# Patient Record
Sex: FEMALE | Race: WHITE | NOT HISPANIC OR LATINO | Employment: OTHER | URBAN - METROPOLITAN AREA
[De-identification: names, ages, dates, MRNs, and addresses within clinical notes are randomized per-mention and may not be internally consistent; named-entity substitution may affect disease eponyms.]

---

## 2024-05-30 ENCOUNTER — APPOINTMENT (OUTPATIENT)
Dept: GENERAL RADIOLOGY | Facility: HOSPITAL | Age: 54
DRG: 897 | End: 2024-05-30
Payer: MEDICAID

## 2024-05-30 ENCOUNTER — APPOINTMENT (OUTPATIENT)
Dept: CT IMAGING | Facility: HOSPITAL | Age: 54
DRG: 897 | End: 2024-05-30
Payer: MEDICAID

## 2024-05-30 ENCOUNTER — HOSPITAL ENCOUNTER (INPATIENT)
Facility: HOSPITAL | Age: 54
LOS: 7 days | Discharge: NURSING, REHAB OR LONG-TERM CARE | DRG: 897 | End: 2024-06-07
Attending: EMERGENCY MEDICINE | Admitting: FAMILY MEDICINE
Payer: MEDICAID

## 2024-05-30 DIAGNOSIS — N30.00 ACUTE CYSTITIS WITHOUT HEMATURIA: ICD-10-CM

## 2024-05-30 DIAGNOSIS — F10.939 ALCOHOL WITHDRAWAL SYNDROME WITH COMPLICATION: Primary | ICD-10-CM

## 2024-05-30 DIAGNOSIS — E87.6 HYPOKALEMIA: ICD-10-CM

## 2024-05-30 PROBLEM — F10.929 ALCOHOL INTOXICATION: Status: ACTIVE | Noted: 2024-05-30

## 2024-05-30 PROBLEM — J44.9 COPD (CHRONIC OBSTRUCTIVE PULMONARY DISEASE): Status: ACTIVE | Noted: 2024-05-30

## 2024-05-30 PROBLEM — R79.89 ELEVATED LFTS: Status: ACTIVE | Noted: 2024-05-30

## 2024-05-30 LAB
ALBUMIN SERPL-MCNC: 3.8 G/DL (ref 3.5–5.2)
ALBUMIN/GLOB SERPL: 1.2 G/DL
ALP SERPL-CCNC: 232 U/L (ref 39–117)
ALT SERPL W P-5'-P-CCNC: 87 U/L (ref 1–33)
AMPHET+METHAMPHET UR QL: NEGATIVE
AMPHETAMINES UR QL: NEGATIVE
ANION GAP SERPL CALCULATED.3IONS-SCNC: 14 MMOL/L (ref 5–15)
AST SERPL-CCNC: 289 U/L (ref 1–32)
BACTERIA UR QL AUTO: ABNORMAL /HPF
BARBITURATES UR QL SCN: NEGATIVE
BASOPHILS # BLD AUTO: 0.07 10*3/MM3 (ref 0–0.2)
BASOPHILS NFR BLD AUTO: 1.3 % (ref 0–1.5)
BENZODIAZ UR QL SCN: NEGATIVE
BILIRUB SERPL-MCNC: 0.9 MG/DL (ref 0–1.2)
BILIRUB UR QL STRIP: ABNORMAL
BUN SERPL-MCNC: 7 MG/DL (ref 6–20)
BUN/CREAT SERPL: 9.6 (ref 7–25)
BUPRENORPHINE SERPL-MCNC: NEGATIVE NG/ML
CALCIUM SPEC-SCNC: 8.7 MG/DL (ref 8.6–10.5)
CANNABINOIDS SERPL QL: POSITIVE
CHLORIDE SERPL-SCNC: 101 MMOL/L (ref 98–107)
CLARITY UR: ABNORMAL
CO2 SERPL-SCNC: 31 MMOL/L (ref 22–29)
COCAINE UR QL: NEGATIVE
COLOR UR: ABNORMAL
CREAT SERPL-MCNC: 0.73 MG/DL (ref 0.57–1)
D-LACTATE SERPL-SCNC: 2.7 MMOL/L (ref 0.5–2)
DEPRECATED RDW RBC AUTO: 55 FL (ref 37–54)
EGFRCR SERPLBLD CKD-EPI 2021: 99.7 ML/MIN/1.73
EOSINOPHIL # BLD AUTO: 0.05 10*3/MM3 (ref 0–0.4)
EOSINOPHIL NFR BLD AUTO: 0.9 % (ref 0.3–6.2)
ERYTHROCYTE [DISTWIDTH] IN BLOOD BY AUTOMATED COUNT: 14.6 % (ref 12.3–15.4)
ETHANOL BLD-MCNC: 295 MG/DL (ref 0–10)
FENTANYL UR-MCNC: NEGATIVE NG/ML
GLOBULIN UR ELPH-MCNC: 3.3 GM/DL
GLUCOSE SERPL-MCNC: 121 MG/DL (ref 65–99)
GLUCOSE UR STRIP-MCNC: NEGATIVE MG/DL
HCT VFR BLD AUTO: 40.9 % (ref 34–46.6)
HGB BLD-MCNC: 14.1 G/DL (ref 12–15.9)
HGB UR QL STRIP.AUTO: ABNORMAL
HYALINE CASTS UR QL AUTO: ABNORMAL /LPF
IMM GRANULOCYTES # BLD AUTO: 0.03 10*3/MM3 (ref 0–0.05)
IMM GRANULOCYTES NFR BLD AUTO: 0.5 % (ref 0–0.5)
KETONES UR QL STRIP: ABNORMAL
LEUKOCYTE ESTERASE UR QL STRIP.AUTO: ABNORMAL
LYMPHOCYTES # BLD AUTO: 2 10*3/MM3 (ref 0.7–3.1)
LYMPHOCYTES NFR BLD AUTO: 36.6 % (ref 19.6–45.3)
MAGNESIUM SERPL-MCNC: 1.7 MG/DL (ref 1.6–2.6)
MCH RBC QN AUTO: 35.7 PG (ref 26.6–33)
MCHC RBC AUTO-ENTMCNC: 34.5 G/DL (ref 31.5–35.7)
MCV RBC AUTO: 103.5 FL (ref 79–97)
METHADONE UR QL SCN: NEGATIVE
MONOCYTES # BLD AUTO: 0.47 10*3/MM3 (ref 0.1–0.9)
MONOCYTES NFR BLD AUTO: 8.6 % (ref 5–12)
NEUTROPHILS NFR BLD AUTO: 2.85 10*3/MM3 (ref 1.7–7)
NEUTROPHILS NFR BLD AUTO: 52.1 % (ref 42.7–76)
NITRITE UR QL STRIP: POSITIVE
NRBC BLD AUTO-RTO: 0 /100 WBC (ref 0–0.2)
OPIATES UR QL: NEGATIVE
OXYCODONE UR QL SCN: NEGATIVE
PCP UR QL SCN: NEGATIVE
PH UR STRIP.AUTO: 6 [PH] (ref 5–8)
PLATELET # BLD AUTO: 127 10*3/MM3 (ref 140–450)
PMV BLD AUTO: 10.3 FL (ref 6–12)
POTASSIUM SERPL-SCNC: 2.9 MMOL/L (ref 3.5–5.2)
PROT SERPL-MCNC: 7.1 G/DL (ref 6–8.5)
PROT UR QL STRIP: ABNORMAL
RBC # BLD AUTO: 3.95 10*6/MM3 (ref 3.77–5.28)
RBC # UR STRIP: ABNORMAL /HPF
REF LAB TEST METHOD: ABNORMAL
SODIUM SERPL-SCNC: 146 MMOL/L (ref 136–145)
SP GR UR STRIP: 1.03 (ref 1–1.03)
SQUAMOUS #/AREA URNS HPF: ABNORMAL /HPF
TRICYCLICS UR QL SCN: NEGATIVE
UROBILINOGEN UR QL STRIP: ABNORMAL
WBC # UR STRIP: ABNORMAL /HPF
WBC NRBC COR # BLD AUTO: 5.47 10*3/MM3 (ref 3.4–10.8)

## 2024-05-30 PROCEDURE — 25010000002 CEFTRIAXONE PER 250 MG: Performed by: PHYSICIAN ASSISTANT

## 2024-05-30 PROCEDURE — 99291 CRITICAL CARE FIRST HOUR: CPT

## 2024-05-30 PROCEDURE — 25010000002 THIAMINE HCL 200 MG/2ML SOLUTION: Performed by: NURSE PRACTITIONER

## 2024-05-30 PROCEDURE — G0378 HOSPITAL OBSERVATION PER HR: HCPCS

## 2024-05-30 PROCEDURE — 80307 DRUG TEST PRSMV CHEM ANLYZR: CPT | Performed by: PHYSICIAN ASSISTANT

## 2024-05-30 PROCEDURE — 25010000002 THIAMINE HCL 200 MG/2ML SOLUTION: Performed by: PHYSICIAN ASSISTANT

## 2024-05-30 PROCEDURE — 36415 COLL VENOUS BLD VENIPUNCTURE: CPT

## 2024-05-30 PROCEDURE — 25810000003 SODIUM CHLORIDE 0.9 % SOLUTION: Performed by: PHYSICIAN ASSISTANT

## 2024-05-30 PROCEDURE — 25010000002 ONDANSETRON PER 1 MG: Performed by: NURSE PRACTITIONER

## 2024-05-30 PROCEDURE — 25810000003 LACTATED RINGERS PER 1000 ML: Performed by: NURSE PRACTITIONER

## 2024-05-30 PROCEDURE — 82077 ASSAY SPEC XCP UR&BREATH IA: CPT | Performed by: PHYSICIAN ASSISTANT

## 2024-05-30 PROCEDURE — 83605 ASSAY OF LACTIC ACID: CPT | Performed by: PHYSICIAN ASSISTANT

## 2024-05-30 PROCEDURE — 87086 URINE CULTURE/COLONY COUNT: CPT | Performed by: NURSE PRACTITIONER

## 2024-05-30 PROCEDURE — 87088 URINE BACTERIA CULTURE: CPT | Performed by: NURSE PRACTITIONER

## 2024-05-30 PROCEDURE — 87186 SC STD MICRODIL/AGAR DIL: CPT | Performed by: NURSE PRACTITIONER

## 2024-05-30 PROCEDURE — 87040 BLOOD CULTURE FOR BACTERIA: CPT | Performed by: PHYSICIAN ASSISTANT

## 2024-05-30 PROCEDURE — 85025 COMPLETE CBC W/AUTO DIFF WBC: CPT | Performed by: PHYSICIAN ASSISTANT

## 2024-05-30 PROCEDURE — 71045 X-RAY EXAM CHEST 1 VIEW: CPT

## 2024-05-30 PROCEDURE — 25510000001 IOPAMIDOL 61 % SOLUTION: Performed by: EMERGENCY MEDICINE

## 2024-05-30 PROCEDURE — 81001 URINALYSIS AUTO W/SCOPE: CPT | Performed by: PHYSICIAN ASSISTANT

## 2024-05-30 PROCEDURE — 25010000002 LORAZEPAM PER 2 MG: Performed by: EMERGENCY MEDICINE

## 2024-05-30 PROCEDURE — 74177 CT ABD & PELVIS W/CONTRAST: CPT

## 2024-05-30 PROCEDURE — 83735 ASSAY OF MAGNESIUM: CPT | Performed by: PHYSICIAN ASSISTANT

## 2024-05-30 PROCEDURE — 99223 1ST HOSP IP/OBS HIGH 75: CPT | Performed by: NURSE PRACTITIONER

## 2024-05-30 PROCEDURE — 80053 COMPREHEN METABOLIC PANEL: CPT | Performed by: PHYSICIAN ASSISTANT

## 2024-05-30 RX ORDER — SODIUM CHLORIDE, SODIUM LACTATE, POTASSIUM CHLORIDE, CALCIUM CHLORIDE 600; 310; 30; 20 MG/100ML; MG/100ML; MG/100ML; MG/100ML
75 INJECTION, SOLUTION INTRAVENOUS CONTINUOUS
Status: ACTIVE | OUTPATIENT
Start: 2024-05-30 | End: 2024-05-31

## 2024-05-30 RX ORDER — LORAZEPAM 1 MG/1
1 TABLET ORAL DAILY
Qty: 1 TABLET | Refills: 0 | Status: COMPLETED | OUTPATIENT
Start: 2024-06-03 | End: 2024-06-03

## 2024-05-30 RX ORDER — MIDAZOLAM HYDROCHLORIDE 1 MG/ML
2 INJECTION INTRAMUSCULAR; INTRAVENOUS
Status: DISCONTINUED | OUTPATIENT
Start: 2024-05-30 | End: 2024-06-03

## 2024-05-30 RX ORDER — POTASSIUM CHLORIDE 1.5 G/1.58G
40 POWDER, FOR SOLUTION ORAL EVERY 4 HOURS
Status: COMPLETED | OUTPATIENT
Start: 2024-05-30 | End: 2024-05-31

## 2024-05-30 RX ORDER — LORAZEPAM 2 MG/ML
1 INJECTION INTRAMUSCULAR ONCE
Status: COMPLETED | OUTPATIENT
Start: 2024-05-30 | End: 2024-05-30

## 2024-05-30 RX ORDER — BISACODYL 5 MG/1
5 TABLET, DELAYED RELEASE ORAL DAILY PRN
Status: DISCONTINUED | OUTPATIENT
Start: 2024-05-30 | End: 2024-06-07 | Stop reason: HOSPADM

## 2024-05-30 RX ORDER — LORAZEPAM 1 MG/1
1 TABLET ORAL EVERY 6 HOURS
Qty: 4 TABLET | Refills: 0 | Status: DISPENSED | OUTPATIENT
Start: 2024-06-01 | End: 2024-06-01

## 2024-05-30 RX ORDER — SODIUM CHLORIDE 0.9 % (FLUSH) 0.9 %
10 SYRINGE (ML) INJECTION EVERY 12 HOURS SCHEDULED
Status: DISCONTINUED | OUTPATIENT
Start: 2024-05-30 | End: 2024-06-07 | Stop reason: HOSPADM

## 2024-05-30 RX ORDER — ENOXAPARIN SODIUM 100 MG/ML
40 INJECTION SUBCUTANEOUS DAILY
Status: DISCONTINUED | OUTPATIENT
Start: 2024-05-31 | End: 2024-06-07 | Stop reason: HOSPADM

## 2024-05-30 RX ORDER — AMOXICILLIN 250 MG
2 CAPSULE ORAL 2 TIMES DAILY
Status: DISCONTINUED | OUTPATIENT
Start: 2024-05-30 | End: 2024-06-07 | Stop reason: HOSPADM

## 2024-05-30 RX ORDER — LORAZEPAM 1 MG/1
1 TABLET ORAL EVERY 12 HOURS SCHEDULED
Qty: 2 TABLET | Refills: 0 | Status: COMPLETED | OUTPATIENT
Start: 2024-06-02 | End: 2024-06-02

## 2024-05-30 RX ORDER — LORAZEPAM 1 MG/1
2 TABLET ORAL
Status: DISCONTINUED | OUTPATIENT
Start: 2024-05-30 | End: 2024-06-03

## 2024-05-30 RX ORDER — MIDAZOLAM HYDROCHLORIDE 1 MG/ML
4 INJECTION INTRAMUSCULAR; INTRAVENOUS
Status: DISCONTINUED | OUTPATIENT
Start: 2024-05-30 | End: 2024-06-03

## 2024-05-30 RX ORDER — POLYETHYLENE GLYCOL 3350 17 G/17G
17 POWDER, FOR SOLUTION ORAL DAILY PRN
Status: DISCONTINUED | OUTPATIENT
Start: 2024-05-30 | End: 2024-06-07 | Stop reason: HOSPADM

## 2024-05-30 RX ORDER — THIAMINE HYDROCHLORIDE 100 MG/ML
200 INJECTION, SOLUTION INTRAMUSCULAR; INTRAVENOUS EVERY 8 HOURS SCHEDULED
Qty: 30 ML | Refills: 0 | Status: DISPENSED | OUTPATIENT
Start: 2024-05-31 | End: 2024-06-04

## 2024-05-30 RX ORDER — IPRATROPIUM BROMIDE AND ALBUTEROL SULFATE 2.5; .5 MG/3ML; MG/3ML
3 SOLUTION RESPIRATORY (INHALATION) EVERY 4 HOURS PRN
Status: DISCONTINUED | OUTPATIENT
Start: 2024-05-30 | End: 2024-06-07 | Stop reason: HOSPADM

## 2024-05-30 RX ORDER — ONDANSETRON 2 MG/ML
4 INJECTION INTRAMUSCULAR; INTRAVENOUS EVERY 6 HOURS PRN
Status: DISCONTINUED | OUTPATIENT
Start: 2024-05-30 | End: 2024-06-07 | Stop reason: HOSPADM

## 2024-05-30 RX ORDER — BISACODYL 10 MG
10 SUPPOSITORY, RECTAL RECTAL DAILY PRN
Status: DISCONTINUED | OUTPATIENT
Start: 2024-05-30 | End: 2024-06-07 | Stop reason: HOSPADM

## 2024-05-30 RX ORDER — SODIUM CHLORIDE 0.9 % (FLUSH) 0.9 %
10 SYRINGE (ML) INJECTION AS NEEDED
Status: DISCONTINUED | OUTPATIENT
Start: 2024-05-30 | End: 2024-06-07 | Stop reason: HOSPADM

## 2024-05-30 RX ORDER — NICOTINE 21 MG/24HR
1 PATCH, TRANSDERMAL 24 HOURS TRANSDERMAL NIGHTLY
Status: DISCONTINUED | OUTPATIENT
Start: 2024-05-30 | End: 2024-06-04

## 2024-05-30 RX ORDER — NITROGLYCERIN 0.4 MG/1
0.4 TABLET SUBLINGUAL
Status: DISCONTINUED | OUTPATIENT
Start: 2024-05-30 | End: 2024-06-07 | Stop reason: HOSPADM

## 2024-05-30 RX ORDER — NYSTATIN 100000 [USP'U]/G
POWDER TOPICAL EVERY 12 HOURS SCHEDULED
Status: DISCONTINUED | OUTPATIENT
Start: 2024-05-30 | End: 2024-06-07 | Stop reason: HOSPADM

## 2024-05-30 RX ORDER — SODIUM CHLORIDE 9 MG/ML
40 INJECTION, SOLUTION INTRAVENOUS AS NEEDED
Status: DISCONTINUED | OUTPATIENT
Start: 2024-05-30 | End: 2024-06-07 | Stop reason: HOSPADM

## 2024-05-30 RX ORDER — LORAZEPAM 1 MG/1
2 TABLET ORAL EVERY 6 HOURS
Qty: 8 TABLET | Refills: 0 | Status: DISPENSED | OUTPATIENT
Start: 2024-05-31 | End: 2024-05-31

## 2024-05-30 RX ORDER — THIAMINE HYDROCHLORIDE 100 MG/ML
100 INJECTION, SOLUTION INTRAMUSCULAR; INTRAVENOUS ONCE
Status: COMPLETED | OUTPATIENT
Start: 2024-05-30 | End: 2024-05-30

## 2024-05-30 RX ORDER — FOLIC ACID 1 MG/1
1 TABLET ORAL DAILY
Status: DISCONTINUED | OUTPATIENT
Start: 2024-05-31 | End: 2024-06-07 | Stop reason: HOSPADM

## 2024-05-30 RX ORDER — MULTIPLE VITAMINS W/ MINERALS TAB 9MG-400MCG
1 TAB ORAL DAILY
Status: DISCONTINUED | OUTPATIENT
Start: 2024-05-31 | End: 2024-06-07 | Stop reason: HOSPADM

## 2024-05-30 RX ORDER — ONDANSETRON 4 MG/1
4 TABLET, ORALLY DISINTEGRATING ORAL EVERY 6 HOURS PRN
Status: DISCONTINUED | OUTPATIENT
Start: 2024-05-30 | End: 2024-06-07 | Stop reason: HOSPADM

## 2024-05-30 RX ORDER — LORAZEPAM 1 MG/1
1 TABLET ORAL
Status: DISCONTINUED | OUTPATIENT
Start: 2024-05-30 | End: 2024-06-03 | Stop reason: SDUPTHER

## 2024-05-30 RX ADMIN — SODIUM CHLORIDE 1000 MG: 900 INJECTION INTRAVENOUS at 20:53

## 2024-05-30 RX ADMIN — Medication 10 ML: at 23:10

## 2024-05-30 RX ADMIN — IOPAMIDOL 85 ML: 612 INJECTION, SOLUTION INTRAVENOUS at 20:08

## 2024-05-30 RX ADMIN — THIAMINE HYDROCHLORIDE 100 MG: 100 INJECTION, SOLUTION INTRAMUSCULAR; INTRAVENOUS at 20:23

## 2024-05-30 RX ADMIN — SENNOSIDES AND DOCUSATE SODIUM 2 TABLET: 8.6; 5 TABLET ORAL at 23:09

## 2024-05-30 RX ADMIN — LORAZEPAM 1 MG: 2 INJECTION INTRAMUSCULAR; INTRAVENOUS at 19:48

## 2024-05-30 RX ADMIN — SODIUM CHLORIDE 1000 ML: 9 INJECTION, SOLUTION INTRAVENOUS at 19:45

## 2024-05-30 RX ADMIN — Medication 1 PATCH: at 21:47

## 2024-05-30 RX ADMIN — SODIUM CHLORIDE, POTASSIUM CHLORIDE, SODIUM LACTATE AND CALCIUM CHLORIDE 75 ML/HR: 600; 310; 30; 20 INJECTION, SOLUTION INTRAVENOUS at 23:09

## 2024-05-30 RX ADMIN — THIAMINE HYDROCHLORIDE 200 MG: 100 INJECTION, SOLUTION INTRAMUSCULAR; INTRAVENOUS at 23:10

## 2024-05-30 RX ADMIN — LORAZEPAM 2 MG: 1 TABLET ORAL at 23:10

## 2024-05-30 RX ADMIN — SODIUM CHLORIDE 1000 ML: 9 INJECTION, SOLUTION INTRAVENOUS at 20:38

## 2024-05-30 RX ADMIN — POTASSIUM CHLORIDE 40 MEQ: 1.5 POWDER, FOR SOLUTION ORAL at 20:38

## 2024-05-30 RX ADMIN — NYSTATIN: 100000 POWDER TOPICAL at 23:09

## 2024-05-30 RX ADMIN — ONDANSETRON 4 MG: 2 INJECTION INTRAMUSCULAR; INTRAVENOUS at 23:10

## 2024-05-30 NOTE — ED PROVIDER NOTES
"Subjective  History of Present Illness:    Chief Complaint: Alcohol abuse, requesting detox, hurting all over  History of Present Illness: 53-year-old female admits to alcohol abuse, drinks a pint a day she reports, she is complaining of pain all over, including her left flank, and both legs, she is here with her daughter who also states that she has not been eating or drinking well, she has been drinking heavily for at least 10 years.  Onset: Gradual  Duration: Patient drinks daily  Exacerbating / Alleviating factors: Left flank pain, aching all over, leg pain, body aches  Associated symptoms: Anxious, tearful      Nurses Notes reviewed and agree, including vitals, allergies, social history and prior medical history.     REVIEW OF SYSTEMS: All systems reviewed and not pertinent unless noted.    Review of Systems   Constitutional:  Positive for fatigue.   Gastrointestinal:  Positive for abdominal pain.   Neurological:  Positive for weakness.   Psychiatric/Behavioral:          Anxious, tearful   All other systems reviewed and are negative.      Past Medical History:   Diagnosis Date    COPD (chronic obstructive pulmonary disease)        Allergies:    Rifampin      Past Surgical History:   Procedure Laterality Date    CHOLECYSTECTOMY      HYSTERECTOMY           Social History     Socioeconomic History    Marital status: Single   Tobacco Use    Smoking status: Every Day     Types: Cigarettes   Vaping Use    Vaping status: Never Used   Substance and Sexual Activity    Drug use: Not Currently         History reviewed. No pertinent family history.    Objective  Physical Exam:  /90   Pulse 111   Temp 99.1 °F (37.3 °C) (Oral)   Resp 25   Ht 157.5 cm (62\")   Wt 72.6 kg (160 lb)   SpO2 93%   BMI 29.26 kg/m²      Physical Exam  Vitals and nursing note reviewed.   Constitutional:       Appearance: She is well-developed.      Comments: Disheveled appearance   HENT:      Head: Normocephalic and atraumatic.   Eyes:    "   Extraocular Movements: Extraocular movements intact.   Cardiovascular:      Rate and Rhythm: Regular rhythm. Tachycardia present.   Pulmonary:      Effort: Pulmonary effort is normal.      Breath sounds: Normal breath sounds.   Abdominal:      Palpations: Abdomen is soft.   Musculoskeletal:         General: Normal range of motion.      Cervical back: Normal range of motion and neck supple.   Skin:     General: Skin is warm and dry.   Neurological:      Mental Status: She is oriented to person, place, and time.      Deep Tendon Reflexes: Reflexes are normal and symmetric.           Critical Care    Performed by: Delroy Mcbride Jr., PA-C  Authorized by: Zander Carlson MD    Critical care provider statement:     Critical care time (minutes):  30    Critical care time was exclusive of:  Separately billable procedures and treating other patients and teaching time    Critical care was necessary to treat or prevent imminent or life-threatening deterioration of the following conditions: Acute alcohol detox, hypokalemia, requiring cardiac monitoring, electrolyte replacement, and benzodiazepine to prevent seizures.    Critical care was time spent personally by me on the following activities:  Blood draw for specimens, development of treatment plan with patient or surrogate, discussions with consultants, discussions with primary provider, evaluation of patient's response to treatment, examination of patient, ordering and performing treatments and interventions, ordering and review of laboratory studies, ordering and review of radiographic studies, pulse oximetry, re-evaluation of patient's condition and review of old charts    Care discussed with: admitting provider        ED Course:    ED Course as of 05/31/24 0049   u May 30, 2024   1925 [facility]draw[facility] - MDCalc  Calculated on May 30 2024 7:24 PM  18 points -> Patients with scores =9 may require medication for withdrawal. [CS]   2014 Review of  previous  non ED visits, prior labs, prior imaging, available notes from prior evaluations or visits with specialists, medication list, allergies, past medical history, past surgical history     [CS]   2124 I Personally reviewed all laboratory studies performed in the emergency department  [CS]   2125 Discussed the case with Dr. Hughes, patient accepted for admission [CS]      ED Course User Index  [CS] Delroy Mcbride Jr., PAARANZA       Lab Results (last 24 hours)       Procedure Component Value Units Date/Time    CBC Auto Differential [[id number]]  (Abnormal) Collected: 05/30/24 1940    Specimen: Blood Updated: 05/30/24 2006     WBC 5.47 10*3/mm3      RBC 3.95 10*6/mm3      Hemoglobin 14.1 g/dL      Hematocrit 40.9 %      .5 fL      MCH 35.7 pg      MCHC 34.5 g/dL      RDW 14.6 %      RDW-SD 55.0 fl      MPV 10.3 fL      Platelets 127 10*3/mm3      Neutrophil % 52.1 %      Lymphocyte % 36.6 %      Monocyte % 8.6 %      Eosinophil % 0.9 %      Basophil % 1.3 %      Immature Grans % 0.5 %      Neutrophils, Absolute 2.85 10*3/mm3      Lymphocytes, Absolute 2.00 10*3/mm3      Monocytes, Absolute 0.47 10*3/mm3      Eosinophils, Absolute 0.05 10*3/mm3      Basophils, Absolute 0.07 10*3/mm3      Immature Grans, Absolute 0.03 10*3/mm3      nRBC 0.0 /100 WBC     Comprehensive Metabolic Panel [[id number]]  (Abnormal) Collected: 05/30/24 1940    Specimen: Blood Updated: 05/30/24 2020     Glucose 121 mg/dL      BUN 7 mg/dL      Creatinine 0.73 mg/dL      Sodium 146 mmol/L      Potassium 2.9 mmol/L      Chloride 101 mmol/L      CO2 31.0 mmol/L      Calcium 8.7 mg/dL      Total Protein 7.1 g/dL      Albumin 3.8 g/dL      ALT (SGPT) 87 U/L      AST (SGOT) 289 U/L      Alkaline Phosphatase 232 U/L      Total Bilirubin 0.9 mg/dL      Globulin 3.3 gm/dL      Comment: Calculated Result        A/G Ratio 1.2 g/dL      BUN/Creatinine Ratio 9.6     Anion Gap 14.0 mmol/L      eGFR 99.7 mL/min/1.73     Narrative:      GFR Normal  >60  Chronic Kidney Disease <60  Kidney Failure <15      Magnesium [[id number]]  (Normal) Collected: 05/30/24 1940    Specimen: Blood Updated: 05/30/24 2020     Magnesium 1.7 mg/dL     Urinalysis With Microscopic If Indicated (No Culture) - Urine, Clean Catch [[id number]]  (Abnormal) Collected: 05/30/24 1940    Specimen: Urine, Clean Catch Updated: 05/30/24 2019     Color, UA [place]     Appearance, UA Turbid     pH, UA 6.0     Specific Gravity, UA 1.029     Glucose, UA Negative     Ketones, UA Trace     Bilirubin, UA Moderate (2+)     Blood, UA Trace     Protein,  mg/dL (2+)     Leuk Esterase, UA Moderate (2+)     Nitrite, UA Positive     Urobilinogen, UA 2.0 E.U./dL    Ethanol [[id number]]  (Abnormal) Collected: 05/30/24 1940    Specimen: Blood Updated: 05/30/24 2020     Ethanol 295 mg/dL     Narrative:      Elevated lactic acid concentration and lactate dehydrogenase(LD) activity may falsely elevate enzymatically determined ethanol levels. Not for legal purposes.     Urine Drug Screen - Urine, Clean Catch [[id number]]  (Abnormal) Collected: 05/30/24 1940    Specimen: Urine, Clean Catch Updated: 05/30/24 2010     THC, Screen, Urine Positive     Phencyclidine (PCP), Urine Negative     Cocaine Screen, Urine Negative     Methamphetamine, Ur Negative     Opiate Screen Negative     Amphetamine Screen, Urine Negative     Benzodiazepine Screen, Urine Negative     Tricyclic Antidepressants Screen Negative     Methadone Screen, Urine Negative     Barbiturates Screen, Urine Negative     Oxycodone Screen, Urine Negative     Buprenorphine, Screen, Urine Negative    Narrative:      Cutoff For Drugs Screened:    Amphetamines               500 ng/ml  Barbiturates               200 ng/ml  Benzodiazepines            150 ng/ml  Cocaine                    150 ng/ml  Methadone                  200 ng/ml  Opiates                    100 ng/ml  Phencyclidine               25 ng/ml  THC                         50 ng/ml  Methamphetamine             500 ng/ml  Tricyclic Antidepressants  300 ng/ml  Oxycodone                  100 ng/ml  Buprenorphine               10 ng/ml    The normal value for all drugs tested is negative. This report includes unconfirmed screening results, with the cutoff values listed, to be used for medical treatment purposes only.  Unconfirmed results must not be used for non-medical purposes such as employment or legal testing.  Clinical consideration should be applied to any drug of abuse test, particularly when unconfirmed results are used.      Fentanyl, Urine - Urine, Clean Catch [[id number]]  (Normal) Collected: 05/30/24 1940    Specimen: Urine, Clean Catch Updated: 05/30/24 2022     Fentanyl, Urine Negative    Narrative:      Negative Threshold:      Fentanyl 5 ng/mL     The normal value for the drug tested is negative. This report includes final unconfirmed screening results to be used for medical treatment purposes only. Unconfirmed results must not be used for non-medical purposes such as employment or legal testing. Clinical consideration should be applied to any drug of abuse test, particularly when unconfirmed results are used.           Urinalysis, Microscopic Only - Urine, Clean Catch [[id number]]  (Abnormal) Collected: 05/30/24 1940    Specimen: Urine, Clean Catch Updated: 05/30/24 2030     RBC, UA 0-2 /HPF      WBC, UA Too Numerous to Count /HPF      Bacteria, UA 4+ /HPF      Squamous Epithelial Cells, UA 13-20 /HPF      Hyaline Casts, UA       Unable to determine due to loaded field     /LPF     Methodology Manual Light Microscopy    Urine Culture - Urine, Urine, Clean Catch [[id number]] Collected: 05/30/24 1940    Specimen: Urine, Clean Catch Updated: 05/30/24 2102    Blood Culture - Blood, Arm, Right [[id number]] Collected: 05/30/24 2030    Specimen: Blood from Arm, Right Updated: 05/30/24 2057    Lactic Acid, Plasma [[id number]]  (Abnormal) Collected: 05/30/24 2036    Specimen: Blood Updated: 05/30/24 2121      Lactate 2.7 mmol/L      Comment: Falsely depressed results may occur on samples drawn from patients receiving N-Acetylcysteine (NAC) or Metamizole.       Blood Culture - Blood, Hand, Right [[id number]] Collected: 05/30/24 2050    Specimen: Blood from Hand, Right Updated: 05/30/24 2057             XR Chest 1 View    Result Date: 5/30/2024  XR CHEST 1 VW Date of Exam: 5/30/2024 9:41 PM EDT Indication: dyspnea, productive cough Comparison: None available. Findings: Lines: None Lungs: Poor respiratory effort accentuates the pulmonary vasculature and cardiomediastinal silhouette. No definite consolidation. Pleura: No pleural effusion or pneumothorax. Cardiomediastinum: The cardiomediastinal silhouette is normal Soft Tissues: Unremarkable. Bones: No acute osseous abnormality.     Impression: Impression: No acute abnormality. Electronically Signed: Jaiden Mcdonnell DO  5/30/2024 10:19 PM EDT  Workstation ID: [id number]    CT Abdomen Pelvis With Contrast    Result Date: 5/30/2024  CT ABDOMEN PELVIS W CONTRAST Date of Exam: 5/30/2024 8:04 PM EDT Indication: alcohol abuse abd/ left flank pain. Comparison: None available. Technique: Axial CT images were obtained of the abdomen and pelvis following the uneventful intravenous administration of 85 cc Isovue-300 IV contrast . Reconstructed coronal and sagittal images were also obtained. Automated exposure control and iterative construction methods were used. FINDINGS: Lung bases: No masses. No consolidation. Liver: Extensive decreased hepatic attenuation suggesting hepatic steatosis. Spleen:No masses. No perisplenic hematoma. Pancreas:No pancreatic masses. No evidence of pancreatitis. Gallbladder and common bile duct: Prior cholecystectomy. Adrenal glands:No adrenal masses Kidneys and ureters:No kidney stones. No renal masses.No calculi present within the ureters. Normal caliber ureters. Urinary bladder: Urinary bladder wall thickening and urothelial enhancement. Small bowel:Normal  caliber small bowel. Large bowel:No diverticulosis or diverticulitis. No large bowel masses are appreciated Appendix: Not seen however there is no evidence of appendicitis. GENITOURINARY: Prior hysterectomy Ascites or pneumoperitoneum:None. Adenopathy:None present Osseous structures: Degenerative changes of the hips and lumbar spine. Other findings: None     Impression: Hepatic steatosis. Urothelial enhancement of the urinary bladder suggesting cystitis. Electronically Signed: Deric Martinez MD  5/30/2024 8:20 PM EDT  Workstation ID: [id number]        Medical Decision Making  Patient Presentation 53-year-old male presents with request for alcohol detox, presents with alcohol abuse, anxiety, agitation diaphoresis and headache    DDX alcohol detoxification, electrolyte abnormality, hypokalemia, hypomag, dehydration, urinary tract infection    Data Review/ Non ED Records /Analysis/Ordering unique tests  Review of previous  non ED visits, prior labs, prior imaging, available notes from prior evaluations or visits with specialists, medication list, allergies, past medical history, past surgical history        Independent Review Studies  I Personally reviewed all laboratory studies performed in the emergency department     Intervention/Re-evaluation intervention included benzodiazepines, multiple boluses slightly fluid, Rocephin for urinary tract infection,     Independent Clinician discussed the case with the on-call hospitalist Dr. Hughes    Risk Stratification tools/clinical decision rules patient presented with request for alcohol detox, seems anxious, agitated, complaining of left flank pain.  She was found to have a urinary tract infection, dehydration, hypokalemia, High Probability of sudden clinically significant, or life threatening deterioration in the patients condition requiring direct delivery of care and management,  and high complexity decision making emergent intervention included fluid boluses,  broad-spectrum antibiotics, potassium replacement, admission to telemetry for    Shared Decision Making discussed this plan of care with patient and family they were agreeable    Disposition patient admitted    Problems Addressed:  Acute cystitis without hematuria: complicated acute illness or injury  Alcohol withdrawal syndrome with complication: complicated acute illness or injury  Hypokalemia: complicated acute illness or injury    Amount and/or Complexity of Data Reviewed  External Data Reviewed: labs and notes.  Labs: ordered. Decision-making details documented in ED Course.  Radiology: ordered.    Risk  Prescription drug management.  Decision regarding hospitalization.          Final diagnoses:   Alcohol withdrawal syndrome with complication   Acute cystitis without hematuria   Hypokalemia           Disposition admission       Delroy Mcbride Jr., PA-C  05/31/24 0049

## 2024-05-30 NOTE — Clinical Note
Level of Care: Telemetry [5]   Diagnosis: Alcohol intoxication [772448]   Admitting Physician: WILLIAM SOLOMON III [[id number]]   Attending Physician: WILLIAM SOLOMON III [[id number]]   Bed Request Comments: tele obs (not CDU)

## 2024-05-31 ENCOUNTER — APPOINTMENT (OUTPATIENT)
Dept: CT IMAGING | Facility: HOSPITAL | Age: 54
DRG: 897 | End: 2024-05-31
Payer: MEDICAID

## 2024-05-31 LAB
ANION GAP SERPL CALCULATED.3IONS-SCNC: 13 MMOL/L (ref 5–15)
BASOPHILS # BLD AUTO: 0.05 10*3/MM3 (ref 0–0.2)
BASOPHILS NFR BLD AUTO: 0.9 % (ref 0–1.5)
BUN SERPL-MCNC: 6 MG/DL (ref 6–20)
BUN/CREAT SERPL: 9.8 (ref 7–25)
CALCIUM SPEC-SCNC: 7.7 MG/DL (ref 8.6–10.5)
CHLORIDE SERPL-SCNC: 107 MMOL/L (ref 98–107)
CO2 SERPL-SCNC: 26 MMOL/L (ref 22–29)
CREAT SERPL-MCNC: 0.61 MG/DL (ref 0.57–1)
D-LACTATE SERPL-SCNC: 1.6 MMOL/L (ref 0.5–2)
D-LACTATE SERPL-SCNC: 2.1 MMOL/L (ref 0.5–2)
D-LACTATE SERPL-SCNC: 2.4 MMOL/L (ref 0.5–2)
DEPRECATED RDW RBC AUTO: 56 FL (ref 37–54)
EGFRCR SERPLBLD CKD-EPI 2021: 108.4 ML/MIN/1.73
EOSINOPHIL # BLD AUTO: 0.03 10*3/MM3 (ref 0–0.4)
EOSINOPHIL NFR BLD AUTO: 0.6 % (ref 0.3–6.2)
ERYTHROCYTE [DISTWIDTH] IN BLOOD BY AUTOMATED COUNT: 14.8 % (ref 12.3–15.4)
GLUCOSE SERPL-MCNC: 125 MG/DL (ref 65–99)
HAV IGM SERPL QL IA: NORMAL
HBV CORE IGM SERPL QL IA: NORMAL
HBV SURFACE AG SERPL QL IA: NORMAL
HCT VFR BLD AUTO: 35.2 % (ref 34–46.6)
HCV AB SER QL: NORMAL
HGB BLD-MCNC: 12.2 G/DL (ref 12–15.9)
IMM GRANULOCYTES # BLD AUTO: 0.04 10*3/MM3 (ref 0–0.05)
IMM GRANULOCYTES NFR BLD AUTO: 0.8 % (ref 0–0.5)
INR PPP: 1.02 (ref 0.89–1.12)
LYMPHOCYTES # BLD AUTO: 1.59 10*3/MM3 (ref 0.7–3.1)
LYMPHOCYTES NFR BLD AUTO: 30.2 % (ref 19.6–45.3)
MAGNESIUM SERPL-MCNC: 1.5 MG/DL (ref 1.6–2.6)
MCH RBC QN AUTO: 35.9 PG (ref 26.6–33)
MCHC RBC AUTO-ENTMCNC: 34.7 G/DL (ref 31.5–35.7)
MCV RBC AUTO: 103.5 FL (ref 79–97)
MONOCYTES # BLD AUTO: 0.42 10*3/MM3 (ref 0.1–0.9)
MONOCYTES NFR BLD AUTO: 8 % (ref 5–12)
NEUTROPHILS NFR BLD AUTO: 3.14 10*3/MM3 (ref 1.7–7)
NEUTROPHILS NFR BLD AUTO: 59.5 % (ref 42.7–76)
NRBC BLD AUTO-RTO: 0.4 /100 WBC (ref 0–0.2)
PHOSPHATE SERPL-MCNC: 1.5 MG/DL (ref 2.5–4.5)
PHOSPHATE SERPL-MCNC: 1.6 MG/DL (ref 2.5–4.5)
PHOSPHATE SERPL-MCNC: 1.9 MG/DL (ref 2.5–4.5)
PLATELET # BLD AUTO: 111 10*3/MM3 (ref 140–450)
PMV BLD AUTO: 10.7 FL (ref 6–12)
POTASSIUM SERPL-SCNC: 3.2 MMOL/L (ref 3.5–5.2)
POTASSIUM SERPL-SCNC: 3.4 MMOL/L (ref 3.5–5.2)
POTASSIUM SERPL-SCNC: 4 MMOL/L (ref 3.5–5.2)
PROTHROMBIN TIME: 13.5 SECONDS (ref 12.2–14.5)
RBC # BLD AUTO: 3.4 10*6/MM3 (ref 3.77–5.28)
SODIUM SERPL-SCNC: 146 MMOL/L (ref 136–145)
TSH SERPL DL<=0.05 MIU/L-ACNC: 1.16 UIU/ML (ref 0.27–4.2)
WBC NRBC COR # BLD AUTO: 5.27 10*3/MM3 (ref 3.4–10.8)

## 2024-05-31 PROCEDURE — 84100 ASSAY OF PHOSPHORUS: CPT | Performed by: INTERNAL MEDICINE

## 2024-05-31 PROCEDURE — 63710000001 ONDANSETRON ODT 4 MG TABLET DISPERSIBLE: Performed by: NURSE PRACTITIONER

## 2024-05-31 PROCEDURE — 84132 ASSAY OF SERUM POTASSIUM: CPT | Performed by: INTERNAL MEDICINE

## 2024-05-31 PROCEDURE — 70450 CT HEAD/BRAIN W/O DYE: CPT

## 2024-05-31 PROCEDURE — 25810000003 SODIUM CHLORIDE 0.9 % SOLUTION: Performed by: NURSE PRACTITIONER

## 2024-05-31 PROCEDURE — 25010000002 MIDAZOLAM PER 1 MG: Performed by: NURSE PRACTITIONER

## 2024-05-31 PROCEDURE — 83605 ASSAY OF LACTIC ACID: CPT | Performed by: PHYSICIAN ASSISTANT

## 2024-05-31 PROCEDURE — 85025 COMPLETE CBC W/AUTO DIFF WBC: CPT | Performed by: NURSE PRACTITIONER

## 2024-05-31 PROCEDURE — 25010000002 CEFTRIAXONE PER 250 MG: Performed by: NURSE PRACTITIONER

## 2024-05-31 PROCEDURE — 84100 ASSAY OF PHOSPHORUS: CPT | Performed by: NURSE PRACTITIONER

## 2024-05-31 PROCEDURE — 99232 SBSQ HOSP IP/OBS MODERATE 35: CPT | Performed by: INTERNAL MEDICINE

## 2024-05-31 PROCEDURE — 84132 ASSAY OF SERUM POTASSIUM: CPT | Performed by: NURSE PRACTITIONER

## 2024-05-31 PROCEDURE — 80074 ACUTE HEPATITIS PANEL: CPT | Performed by: INTERNAL MEDICINE

## 2024-05-31 PROCEDURE — 85610 PROTHROMBIN TIME: CPT | Performed by: NURSE PRACTITIONER

## 2024-05-31 PROCEDURE — 93005 ELECTROCARDIOGRAM TRACING: CPT | Performed by: INTERNAL MEDICINE

## 2024-05-31 PROCEDURE — 25010000002 MAGNESIUM SULFATE 2 GM/50ML SOLUTION: Performed by: INTERNAL MEDICINE

## 2024-05-31 PROCEDURE — 80048 BASIC METABOLIC PNL TOTAL CA: CPT | Performed by: NURSE PRACTITIONER

## 2024-05-31 PROCEDURE — 25010000002 THIAMINE HCL 200 MG/2ML SOLUTION: Performed by: NURSE PRACTITIONER

## 2024-05-31 PROCEDURE — 25810000003 LACTATED RINGERS PER 1000 ML: Performed by: INTERNAL MEDICINE

## 2024-05-31 PROCEDURE — 25010000002 ENOXAPARIN PER 10 MG: Performed by: NURSE PRACTITIONER

## 2024-05-31 PROCEDURE — 83735 ASSAY OF MAGNESIUM: CPT | Performed by: NURSE PRACTITIONER

## 2024-05-31 PROCEDURE — 84443 ASSAY THYROID STIM HORMONE: CPT | Performed by: NURSE PRACTITIONER

## 2024-05-31 PROCEDURE — 93010 ELECTROCARDIOGRAM REPORT: CPT | Performed by: INTERNAL MEDICINE

## 2024-05-31 RX ORDER — SODIUM CHLORIDE, SODIUM LACTATE, POTASSIUM CHLORIDE, CALCIUM CHLORIDE 600; 310; 30; 20 MG/100ML; MG/100ML; MG/100ML; MG/100ML
75 INJECTION, SOLUTION INTRAVENOUS CONTINUOUS
Status: ACTIVE | OUTPATIENT
Start: 2024-05-31 | End: 2024-06-01

## 2024-05-31 RX ORDER — MAGNESIUM SULFATE HEPTAHYDRATE 40 MG/ML
2 INJECTION, SOLUTION INTRAVENOUS
Status: COMPLETED | OUTPATIENT
Start: 2024-05-31 | End: 2024-05-31

## 2024-05-31 RX ORDER — POTASSIUM CHLORIDE 1.5 G/1.58G
40 POWDER, FOR SOLUTION ORAL EVERY 4 HOURS
Status: COMPLETED | OUTPATIENT
Start: 2024-05-31 | End: 2024-05-31

## 2024-05-31 RX ADMIN — POTASSIUM & SODIUM PHOSPHATES POWDER PACK 280-160-250 MG 2 PACKET: 280-160-250 PACK at 14:45

## 2024-05-31 RX ADMIN — POTASSIUM CHLORIDE 40 MEQ: 1.5 POWDER, FOR SOLUTION ORAL at 16:44

## 2024-05-31 RX ADMIN — POTASSIUM CHLORIDE 40 MEQ: 1.5 POWDER, FOR SOLUTION ORAL at 10:26

## 2024-05-31 RX ADMIN — THIAMINE HYDROCHLORIDE 200 MG: 100 INJECTION, SOLUTION INTRAMUSCULAR; INTRAVENOUS at 08:00

## 2024-05-31 RX ADMIN — LORAZEPAM 1 MG: 1 TABLET ORAL at 23:05

## 2024-05-31 RX ADMIN — POTASSIUM CHLORIDE 40 MEQ: 1.5 POWDER, FOR SOLUTION ORAL at 04:46

## 2024-05-31 RX ADMIN — LORAZEPAM 2 MG: 1 TABLET ORAL at 11:07

## 2024-05-31 RX ADMIN — MIDAZOLAM HYDROCHLORIDE 2 MG: 1 INJECTION, SOLUTION INTRAMUSCULAR; INTRAVENOUS at 02:45

## 2024-05-31 RX ADMIN — MAGNESIUM SULFATE HEPTAHYDRATE 2 G: 2 INJECTION, SOLUTION INTRAVENOUS at 08:00

## 2024-05-31 RX ADMIN — ONDANSETRON 4 MG: 4 TABLET, ORALLY DISINTEGRATING ORAL at 05:46

## 2024-05-31 RX ADMIN — NYSTATIN: 100000 POWDER TOPICAL at 10:11

## 2024-05-31 RX ADMIN — SENNOSIDES AND DOCUSATE SODIUM 2 TABLET: 8.6; 5 TABLET ORAL at 08:01

## 2024-05-31 RX ADMIN — FOLIC ACID 1 MG: 1 TABLET ORAL at 08:01

## 2024-05-31 RX ADMIN — Medication 1 TABLET: at 08:01

## 2024-05-31 RX ADMIN — LORAZEPAM 2 MG: 1 TABLET ORAL at 21:22

## 2024-05-31 RX ADMIN — ENOXAPARIN SODIUM 40 MG: 100 INJECTION SUBCUTANEOUS at 08:00

## 2024-05-31 RX ADMIN — LORAZEPAM 1 MG: 1 TABLET ORAL at 15:08

## 2024-05-31 RX ADMIN — LORAZEPAM 2 MG: 1 TABLET ORAL at 05:46

## 2024-05-31 RX ADMIN — SODIUM CHLORIDE 1000 MG: 900 INJECTION INTRAVENOUS at 20:49

## 2024-05-31 RX ADMIN — Medication 1 PATCH: at 22:41

## 2024-05-31 RX ADMIN — MIDAZOLAM HYDROCHLORIDE 2 MG: 1 INJECTION, SOLUTION INTRAMUSCULAR; INTRAVENOUS at 19:48

## 2024-05-31 RX ADMIN — SODIUM CHLORIDE, POTASSIUM CHLORIDE, SODIUM LACTATE AND CALCIUM CHLORIDE 75 ML/HR: 600; 310; 30; 20 INJECTION, SOLUTION INTRAVENOUS at 18:15

## 2024-05-31 RX ADMIN — POTASSIUM CHLORIDE 40 MEQ: 1.5 POWDER, FOR SOLUTION ORAL at 01:08

## 2024-05-31 RX ADMIN — THIAMINE HYDROCHLORIDE 200 MG: 100 INJECTION, SOLUTION INTRAMUSCULAR; INTRAVENOUS at 16:44

## 2024-05-31 RX ADMIN — MAGNESIUM SULFATE HEPTAHYDRATE 2 G: 2 INJECTION, SOLUTION INTRAVENOUS at 04:46

## 2024-05-31 RX ADMIN — SODIUM CHLORIDE 500 ML: 9 INJECTION, SOLUTION INTRAVENOUS at 02:30

## 2024-05-31 RX ADMIN — POTASSIUM & SODIUM PHOSPHATES POWDER PACK 280-160-250 MG 2 PACKET: 280-160-250 PACK at 04:46

## 2024-05-31 RX ADMIN — MIDAZOLAM HYDROCHLORIDE 4 MG: 1 INJECTION, SOLUTION INTRAMUSCULAR; INTRAVENOUS at 04:45

## 2024-05-31 RX ADMIN — LORAZEPAM 1 MG: 1 TABLET ORAL at 08:00

## 2024-05-31 RX ADMIN — POTASSIUM & SODIUM PHOSPHATES POWDER PACK 280-160-250 MG 2 PACKET: 280-160-250 PACK at 22:42

## 2024-05-31 RX ADMIN — MAGNESIUM SULFATE HEPTAHYDRATE 2 G: 2 INJECTION, SOLUTION INTRAVENOUS at 10:26

## 2024-05-31 RX ADMIN — THIAMINE HYDROCHLORIDE 200 MG: 100 INJECTION, SOLUTION INTRAMUSCULAR; INTRAVENOUS at 23:05

## 2024-05-31 NOTE — H&P
[facility]  HISTORY AND PHYSICAL    Patient Name: Gertrudis Perera  : 1970  MRN: [id number]  Primary Care Physician: Provider, No Known  Date of admission: 2024    Subjective   Subjective     Chief Complaint:  Left sided pain    HPI:  Gertrudis Perera is a 53 y.o. female with a history of COPD, alcohol abuse, presents to the ED with complaints of left flank pain that started yesterday.  Patient reports that today she has been experiencing sharp stabbing left-sided pain and pain in both legs.  She also endorses fever, chills, fatigue, loss of appetite, shortness of air, productive cough with green sputum, wheezing, edema, constipation, dysuria, foul odor to urine, dizziness, lightheadedness, generalized weakness.  Patient states that she drinks a pint of bourbon daily for the past 10 years.  She states that when she starts to withdrawal she starts drinking to help relieve symptoms.  This morning she was experiencing tremors to her upper extremities, nausea, and vomiting which prompted her to drink.  Her last drink was this morning.  She also states that she used THC today to help with symptoms.  No chest pain, diarrhea, difficulty urinating, headaches, syncope, or any other complaints at this time.  CT abdomen pelvis shows hepatic steatosis.  Urothelial enhancement of the urinary bladder suggesting cystitis.  Patient was given 2 L of saline and started on Rocephin in the ED.  Patient is being admitted to the hospitalist for further evaluation management.        Review of Systems   Constitutional:  Positive for appetite change, chills, fatigue and fever.   HENT:  Positive for congestion and sore throat.    Eyes:  Positive for discharge.   Respiratory:  Positive for cough, shortness of breath and wheezing.    Cardiovascular:  Positive for leg swelling.   Gastrointestinal:  Positive for abdominal pain, constipation, nausea and vomiting.   Endocrine: Negative.     Genitourinary:  Positive for dysuria and flank pain. Negative for difficulty urinating, frequency and urgency.   Musculoskeletal:  Positive for back pain. Negative for neck pain and neck stiffness.   Skin: Negative.    Allergic/Immunologic: Negative.    Neurological:  Positive for dizziness, tremors, weakness and light-headedness. Negative for seizures, syncope and headaches.   Hematological: Negative.    Psychiatric/Behavioral: Negative.                  Personal History     Past Medical History:   Diagnosis Date   • COPD (chronic obstructive pulmonary disease)              Past Surgical History:   Procedure Laterality Date   • CHOLECYSTECTOMY     • HYSTERECTOMY         Family History:  family history is not on file.     Social History:    Social History     Social History Narrative   • Not on file       Medications:       Allergies   Allergen Reactions   • Rifampin Nausea Only       Objective   Objective     Vital Signs:   Temp:  [99.1 °F (37.3 °C)] 99.1 °F (37.3 °C)  Heart Rate:  [101-149] 111  Resp:  [25] 25  BP: (115-145)/(73-98) 145/90    Physical Exam   Constitutional: Awake, alert, resting in bed, daughter at bedside  Eyes: PERRLA, sclerae anicteric, no conjunctival injection  HENT: NCAT, mucous membranes moist  Neck: Supple, no thyromegaly, no lymphadenopathy, trachea midline  Respiratory: course bilaterally diminished in the bases, nonlabored respirations, non productive cough during exam  Cardiovascular: tachycardia, no murmurs, rubs, or gallops, palpable pedal pulses bilaterally  Gastrointestinal: Positive bowel sounds, soft, nontender, nondistended  Musculoskeletal: No bilateral ankle edema, no clubbing or cyanosis to extremities  Psychiatric: Appropriate affect, cooperative  Neurologic: Oriented x 3, strength symmetric in all extremities, Cranial Nerves grossly intact to confrontation, speech clear, tremors to BUE  Skin: No rashes       Result Review:  I have personally reviewed the results from the  time of this admission to 5/30/2024 22:23 EDT and agree with these findings:  [x]  Laboratory list / accordion  []  Microbiology  [x]  Radiology  []  EKG/Telemetry   []  Cardiology/Vascular   []  Pathology  [x]  Old records  []  Other:  Most notable findings include:     LAB RESULTS:      Lab 05/30/24 2036 05/30/24 1940   WBC  --  5.47   HEMOGLOBIN  --  14.1   HEMATOCRIT  --  40.9   PLATELETS  --  127*   NEUTROS ABS  --  2.85   IMMATURE GRANS (ABS)  --  0.03   LYMPHS ABS  --  2.00   MONOS ABS  --  0.47   EOS ABS  --  0.05   MCV  --  103.5*   LACTATE 2.7*  --          Lab 05/30/24 1940   SODIUM 146*   POTASSIUM 2.9*   CHLORIDE 101   CO2 31.0*   ANION GAP 14.0   BUN 7   CREATININE 0.73   EGFR 99.7   GLUCOSE 121*   CALCIUM 8.7   MAGNESIUM 1.7         Lab 05/30/24 1940   TOTAL PROTEIN 7.1   ALBUMIN 3.8   GLOBULIN 3.3   ALT (SGPT) 87*   AST (SGOT) 289*   BILIRUBIN 0.9   ALK PHOS 232*                     Brief Urine Lab Results  (Last result in the past 365 days)        Color   Clarity   Blood   Leuk Est   Nitrite   Protein   CREAT   Urine HCG        05/30/24 1940 Orange   Turbid   Trace   Moderate (2+)   Positive   100 mg/dL (2+)                 Microbiology Results (last 10 days)       ** No results found for the last 240 hours. **            XR Chest 1 View    Result Date: 5/30/2024  XR CHEST 1 VW Date of Exam: 5/30/2024 9:41 PM EDT Indication: dyspnea, productive cough Comparison: None available. Findings: Lines: None Lungs: Poor respiratory effort accentuates the pulmonary vasculature and cardiomediastinal silhouette. No definite consolidation. Pleura: No pleural effusion or pneumothorax. Cardiomediastinum: The cardiomediastinal silhouette is normal Soft Tissues: Unremarkable. Bones: No acute osseous abnormality.     Impression: Impression: No acute abnormality. Electronically Signed: Jaiden Mcdonnell DO  5/30/2024 10:19 PM EDT  Workstation ID: [id number]    CT Abdomen Pelvis With Contrast    Result Date:  5/30/2024  CT ABDOMEN PELVIS W CONTRAST Date of Exam: 5/30/2024 8:04 PM EDT Indication: alcohol abuse abd/ left flank pain. Comparison: None available. Technique: Axial CT images were obtained of the abdomen and pelvis following the uneventful intravenous administration of 85 cc Isovue-300 IV contrast . Reconstructed coronal and sagittal images were also obtained. Automated exposure control and iterative construction methods were used. FINDINGS: Lung bases: No masses. No consolidation. Liver: Extensive decreased hepatic attenuation suggesting hepatic steatosis. Spleen:No masses. No perisplenic hematoma. Pancreas:No pancreatic masses. No evidence of pancreatitis. Gallbladder and common bile duct: Prior cholecystectomy. Adrenal glands:No adrenal masses Kidneys and ureters:No kidney stones. No renal masses.No calculi present within the ureters. Normal caliber ureters. Urinary bladder: Urinary bladder wall thickening and urothelial enhancement. Small bowel:Normal caliber small bowel. Large bowel:No diverticulosis or diverticulitis. No large bowel masses are appreciated Appendix: Not seen however there is no evidence of appendicitis. GENITOURINARY: Prior hysterectomy Ascites or pneumoperitoneum:None. Adenopathy:None present Osseous structures: Degenerative changes of the hips and lumbar spine. Other findings: None     Impression: Hepatic steatosis. Urothelial enhancement of the urinary bladder suggesting cystitis. Electronically Signed: Deric Martinez MD  5/30/2024 8:20 PM EDT  Workstation ID: [id number]         Assessment & Plan   Assessment & Plan       Alcohol intoxication    COPD (chronic obstructive pulmonary disease)    Elevated LFTs    Hypokalemia    Gertrudis Perera is a 53 y.o. female with a history of COPD, alcohol abuse, presents to the ED with complaints of left flank pain that started yesterday.      Assessment and Plan:    ETOH  Elevated LFTs  THC use  Hepatic steatosis  -- drinks a pint of bourbon daily  --  Alkaline phos 232, , ALT 87  -- Ethanol 295  -- UDS positive for THC  -- CT abdomen pelvis shows hepatic steatosis.   -- Was given 2 L of saline in the ED  -- Consult addiction medicine like  -- CIWA protocol  -- Thiamine and multivitamin  -- Ativan as needed per protocol  -- Labs     Acute UTI  -- UA: Color orange, appearance turbid, ketones trace, blood trace, nitrite positive, leukocytes moderate, protein 100, bilirubin moderate, urobilinogen 2.0, WBC TNTC, bacteria 4+, squamous 13-20  -- CT abdomen pelvis shows urothelial enhancement of the urinary bladder suggesting cystitis  -- BC x 2 pending  -- Urine culture  -- Rocephin  -- A.m. labs    Hypokalemia  -- K +2.9  -- Replacement per protocol  -- BMP in the a.m.    COPD  Tobacco abuse  -- duonebs  -- Patient is not on any home meds for her COPD due to not having any insurance.    -- consult case management  -- nicotine patch  -- smoking cessation education    Candidiasis  -- Nystatin powder to groin    DVT prophylaxis:  Lovenox    CODE STATUS:    Level Of Support Discussed With: Patient  Code Status (Patient has no pulse and is not breathing): CPR (Attempt to Resuscitate)  Medical Interventions (Patient has pulse or is breathing): Full Support      Expected Discharge  TBD  Expected discharge date/ time has not been documented.      Signature: Electronically signed by BEBA Calderon, 05/30/24, 10:24 PM EDT.     ---------------------    The patient was seen independently by the APC.  I was available for any questions or concerns.     Electronically signed by Julio César Hughes III, DO, 05/31/24, 1:48 AM EDT.

## 2024-05-31 NOTE — ED NOTES
Gertrudis Perera    Nursing Report ED to Floor:  Mental status: A&Ox4  Ambulatory status: Stand by  Oxygen Therapy:  RA  Cardiac Rhythm: NS  Admitted from: Home  Safety Concerns:  n/a  Social Issues: n/a  ED Room #:  05    ED Nurse Phone Extension - [phone] or may call [phone].      HPI:   Chief Complaint   Patient presents with    Alcohol Problem    Pain       Past Medical History:  Past Medical History:   Diagnosis Date    COPD (chronic obstructive pulmonary disease)         Past Surgical History:  Past Surgical History:   Procedure Laterality Date    CHOLECYSTECTOMY      HYSTERECTOMY          Admitting Doctor:   Julio César Hughes III, DO    Consulting Provider(s):  Consults       No orders found from 5/1/2024 to 5/31/2024.             Admitting Diagnosis:   The primary encounter diagnosis was Alcohol withdrawal syndrome with complication. Diagnoses of Acute cystitis without hematuria and Hypokalemia were also pertinent to this visit.    Most Recent Vitals:   Vitals:    05/30/24 2035 05/30/24 2040 05/30/24 2045 05/30/24 2050   BP:       BP Location:       Patient Position:       Pulse: 101 111 109 116   Resp:       Temp:       TempSrc:       SpO2: 93% 91% 94% 90%   Weight:       Height:           Active LDAs/IV Access:   Lines, Drains & Airways       Active LDAs       Name Placement date Placement time Site Days    Peripheral IV 05/30/24 1941 Right Antecubital 05/30/24 1941  Antecubital  less than 1                    Labs (abnormal labs have a star):   Labs Reviewed   CBC WITH AUTO DIFFERENTIAL - Abnormal; Notable for the following components:       Result Value    .5 (*)     MCH 35.7 (*)     RDW-SD 55.0 (*)     Platelets 127 (*)     All other components within normal limits   COMPREHENSIVE METABOLIC PANEL - Abnormal; Notable for the following components:    Glucose 121 (*)     Sodium 146 (*)     Potassium 2.9 (*)     CO2 31.0 (*)     ALT (SGPT) 87 (*)     AST (SGOT) 289 (*)     Alkaline Phosphatase 232 (*)      All other components within normal limits    Narrative:     GFR Normal >60  Chronic Kidney Disease <60  Kidney Failure <15     URINALYSIS W/ MICROSCOPIC IF INDICATED (NO CULTURE) - Abnormal; Notable for the following components:    Color, UA Orange (*)     Appearance, UA Turbid (*)     Ketones, UA Trace (*)     Bilirubin, UA Moderate (2+) (*)     Blood, UA Trace (*)     Protein,  mg/dL (2+) (*)     Leuk Esterase, UA Moderate (2+) (*)     Nitrite, UA Positive (*)     Urobilinogen, UA 2.0 E.U./dL (*)     All other components within normal limits   ETHANOL - Abnormal; Notable for the following components:    Ethanol 295 (*)     All other components within normal limits    Narrative:     Elevated lactic acid concentration and lactate dehydrogenase(LD) activity may falsely elevate enzymatically determined ethanol levels. Not for legal purposes.    URINE DRUG SCREEN - Abnormal; Notable for the following components:    THC, Screen, Urine Positive (*)     All other components within normal limits    Narrative:     Cutoff For Drugs Screened:    Amphetamines               500 ng/ml  Barbiturates               200 ng/ml  Benzodiazepines            150 ng/ml  Cocaine                    150 ng/ml  Methadone                  200 ng/ml  Opiates                    100 ng/ml  Phencyclidine               25 ng/ml  THC                         50 ng/ml  Methamphetamine            500 ng/ml  Tricyclic Antidepressants  300 ng/ml  Oxycodone                  100 ng/ml  Buprenorphine               10 ng/ml    The normal value for all drugs tested is negative. This report includes unconfirmed screening results, with the cutoff values listed, to be used for medical treatment purposes only.  Unconfirmed results must not be used for non-medical purposes such as employment or legal testing.  Clinical consideration should be applied to any drug of abuse test, particularly when unconfirmed results are used.     URINALYSIS, MICROSCOPIC  ONLY - Abnormal; Notable for the following components:    WBC, UA Too Numerous to Count (*)     Bacteria, UA 4+ (*)     Squamous Epithelial Cells, UA 13-20 (*)     All other components within normal limits   LACTIC ACID, PLASMA - Abnormal; Notable for the following components:    Lactate 2.7 (*)     All other components within normal limits   MAGNESIUM - Normal   FENTANYL, URINE - Normal    Narrative:     Negative Threshold:      Fentanyl 5 ng/mL     The normal value for the drug tested is negative. This report includes final unconfirmed screening results to be used for medical treatment purposes only. Unconfirmed results must not be used for non-medical purposes such as employment or legal testing. Clinical consideration should be applied to any drug of abuse test, particularly when unconfirmed results are used.          BLOOD CULTURE   BLOOD CULTURE   URINE CULTURE   URINALYSIS W/ CULTURE IF INDICATED   LACTIC ACID, REFLEX       Meds Given in ED:   Medications   Potassium Replacement - Follow Nurse / BPA Driven Protocol (has no administration in time range)   potassium chloride (KLOR-CON) packet 40 mEq (40 mEq Oral Given 5/30/24 2038)   nicotine (NICODERM CQ) 21 MG/24HR patch 1 patch (has no administration in time range)   sodium chloride 0.9 % bolus 1,000 mL (0 mL Intravenous Stopped 5/30/24 2029)   LORazepam (ATIVAN) injection 1 mg (1 mg Intravenous Given 5/30/24 1948)   iopamidol (ISOVUE-300) 61 % injection 85 mL (85 mL Intravenous Given 5/30/24 2008)   thiamine (B-1) injection 100 mg (100 mg Intravenous Given 5/30/24 2023)   cefTRIAXone (ROCEPHIN) 1,000 mg in sodium chloride 0.9 % 100 mL MBP (1,000 mg Intravenous New Bag 5/30/24 2053)   sodium chloride 0.9 % bolus 1,000 mL (0 mL Intravenous Stopped 5/30/24 2129)           Last NIH score:                                                          Dysphagia screening results:        [place] Coma Scale:  No data recorded     CIWA:  CIWA-Ar Scoring (Adult)  Nausea  and Vomitin-->no nausea and no vomiting (24)  Tremor: 2 (24)  Paroxysmal Sweats: 1-->barely perceptible sweating, palms moist (24)  Anxiety: 1-->mildly anxious (24)  Agitation: 0-->normal activity (24)  Tactile Disturbances: 0-->none (24)  Auditory Disturbances: 0-->not present (24)  Visual Disturbances: 0-->not present (24)  Headache, Fullness in Head: 1-->very mild (24)  Orientation and Clouding of Sensorium: 0-->oriented and can do serial additions (24)  CIWA-Ar Score: 5 (24)     Restraint Type:            Isolation Status:  No active isolations

## 2024-05-31 NOTE — CASE MANAGEMENT/SOCIAL WORK
Continued Stay Note  CHAKA Franco     Patient Name: Gertrudis Perera  MRN: [id number]  Today's Date: 5/31/2024    Admit Date: 5/30/2024    Plan: Ongoing- we are encouraging her to enter into AUD treatment   Discharge Plan       Row Name 05/31/24 1209       Plan    Plan Ongoing- we are encouraging her to enter into AUD treatment    Plan Comments Consult received, thank you.    HPI:   53-year-old female admits to alcohol abuse, drinks a pint a day she reports, she is complaining of pain all over, including her left flank, and both legs, she is here with her daughter who also states that she has not been eating or drinking well, she has been drinking heavily for at least 10 years.     ETOH last ingestion: PTA  ETOH level at admission: 295; UDS + THC  ETOH Hx: has been drinking heavily for the last decade  ETOH current consumption: approx 1 pint daily  AUDIT: >20  CIWA: 8- currently  Withdrawal potential: Moderate to high  Previous treatment: none  Longest length of sobriety: 3 months (years ago)  Legal Issues? None verbalized  Support System: daughter, Corinne, who was present at the bedside, is very supportive and desperately wants her mom to enter into AUD treatment  Current stressors: loneliness, past trauma  Motivation for change: moderate  Potential Barriers: ambivalence, noncommittal to AUD treatment at this time  Insurance: medicaid pending  Harm Reduction: not discussed- recommend complete ETOH cessation  Recovery planning: discussed many options for treatment of her AUD- see below  Recommendations: AUD treatment  Tentative Plan: Will follow and continue AUD treatment and recovery planning.      Admission labs:    UDS + THC  LFT's- AST/ALT/Total bili=  289/87/0.9  Lactate= 2.7  Plts= 127  K 3.2, Mg 1.5, PO 4 1.6    CIWAs range today: 8-15  Highest CIWA thus far= 15    Interview with patient and daughter today- the patient had several questions regarding how to get better and stop drinking.  She was engaged  "in the conversation and had very thoughtful and direct questions. The patient's affect is flat and at times, she became tearful.  Appears to be harboring quite a bit of shame, fear and self-doubt,  She states that she does not routinely go to a doctor because \"what do I say, I have all these problems, 'cause I'm a drunk?\"  Emotional support and encouragement provided, as well as instructing her to work on decreasing her own stigma regarding her AUD.  Strong family HX of AUD- pt's parents both had AUD, the patient was raised in the foster care sytem, due to her parents being deemed unfit due to their severe AUD.    Cessation Counseling:  Bedside ETOH cessation counseling provided-  Discussed the neurobiology of ETOH use/abuse in lay-man's terms. The continued health detriments of ETOH ingestion were discussed- liver damage, pancreatitis, blood dyscrasias, vitamin/mineral deficiencies, esophageal varices, and possible permanent organ damage.    Overall discussion of treatment options:   30 day inpatient treatment  Intensive Outpatient  AUD medications  Clinics  Counseling services .    Support meetings   Recovery resource centers    Medications discussed:  Baclofen- discussed the use for helping to balance EM/glutamate to decrease cravings  Acamprosate- discussed EM/Glutamate and that this medication MUST be taken 3 x daily  Naltrexone/Vivitrol- discussed the capping of the receptor in the brain, and that the oral form, Naltrexone, is typically prescribed for 14 days, after that time period, if tolerated the IM form-Vivitrol can be administered. Pt is aware that a person needs to be sober for approx 10 days prior to initiation of this med  Antabuse- discussed that it is the oldest drug on the market for AUD- and that it is a deterrent medication.      Information regarding medication management for AUD:  Pt has been provided with a AUD medication information sheet which includes the following medications- " Acamprosate, Naltrexone/Vivitrol, and Baclofen.    The medication information sheet contains an abbreviated explanation of the medications and how to obtain the medications.      Discussed seeking AUD treatment and therapy for past, severe trauma.    Plan:  We will see her again on Monday and further discuss options and see if we can obtain an AUD treatment commitment.    Thank you for involving our team in this pleasant woman's care.                    Discharge Codes    No documentation.                       Tricia Driver RN MA,BSN-  Addiction Medicine

## 2024-05-31 NOTE — CASE MANAGEMENT/SOCIAL WORK
Discharge Planning Assessment  [facility]     Patient Name: Gertrudis Perera  MRN: [id number]  Today's Date: 5/31/2024    Admit Date: 5/30/2024    Plan: home   Discharge Needs Assessment       Row Name 05/31/24 1337       Living Environment    People in Home alone    Current Living Arrangements home    Potentially Unsafe Housing Conditions none    In the past 12 months has the electric, gas, oil, or water company threatened to shut off services in your home? No    Primary Care Provided by self    Provides Primary Care For no one    Family Caregiver if Needed child(mir), adult    Quality of Family Relationships helpful;involved;supportive    Able to Return to Prior Arrangements yes       Resource/Environmental Concerns    Resource/Environmental Concerns none       Transportation Needs    In the past 12 months, has lack of transportation kept you from medical appointments or from getting medications? no    In the past 12 months, has lack of transportation kept you from meetings, work, or from getting things needed for daily living? No       Food Insecurity    Within the past 12 months, you worried that your food would run out before you got the money to buy more. Never true    Within the past 12 months, the food you bought just didn't last and you didn't have money to get more. Never true       Transition Planning    Patient/Family Anticipates Transition to home with family    Patient/Family Anticipated Services at Transition none    Transportation Anticipated family or friend will provide       Discharge Needs Assessment    Readmission Within the Last 30 Days no previous admission in last 30 days    Equipment Currently Used at Home nebulizer    Concerns to be Addressed denies needs/concerns at this time                   Discharge Plan       Row Name 05/31/24 1338       Plan    Plan home    Patient/Family in Agreement with Plan yes    Plan Comments Met with patient and daughter at the bedside to initiate discharge  planning. Patient lives alone in [facility]. She is independent with ADLs and has a nebulizer at home. Patient has Medicaid insurance pending and uses the [facility] in [place] to fill scripts. Patient does not have an established PCP and says she doesnt need one because she doesn't go to the doctor.  Patient's goal at discharge is to return home. CM will continue to follow.    Final Discharge Disposition Code 01 - home or self-care      Row Name 05/31/24 1209       Plan    Plan Ongoing- we are encouraging her to enter into AUD treatment    Plan Comments Comsult received, thank you.                  Continued Care and Services - Admitted Since 5/30/2024    No active coordination exists for this encounter.          Demographic Summary       Row Name 05/31/24 1336       General Information    Admission Type inpatient    Arrived From home    Referral Source physician    Reason for Consult discharge planning    Preferred Language English    General Information Comments No established PCP       Contact Information    Permission Granted to Share Info With family/designee    Contact Information Comments JAQUAN THOMAS (Daughter)  [phone] (Home Phone)                   Functional Status       Row Name 05/31/24 1337       Functional Status    Usual Activity Tolerance good    Current Activity Tolerance moderate       Functional Status, IADL    Medications independent    Meal Preparation independent    Housekeeping independent    Laundry independent    Shopping independent       Mental Status    General Appearance WDL --       Mental Status Summary    Recent Changes in Mental Status/Cognitive Functioning no changes       Employment/    Employment Status retired                   Psychosocial    No documentation.                  Abuse/Neglect    No documentation.                  Legal    No documentation.                  Substance Abuse    No documentation.                  Patient Forms    No  documentation.                     Maki Samano RN

## 2024-05-31 NOTE — PROGRESS NOTES
Continued Stay Note  [facility]     Patient Name: Gertrudis Perera  MRN: [id number]  Today's Date: 5/31/2024    Admit Date: 5/30/2024    Plan: home   Discharge Plan       Row Name 05/31/24 1348       Plan    Plan Comments  with case management met with the patient and gave her the contact information for Medicaid and explained assistance that [employer] provides.    Final Discharge Disposition Code 01 - home or self-care      Row Name 05/31/24 1338       Plan    Plan home    Patient/Family in Agreement with Plan yes    Plan Comments Met with patient and daughter at the bedside to initiate discharge planning. Patient lives alone in [facility]. She is independent with ADLs and has a nebulizer at home. Patient has Medicaid insurance pending and uses the [facility] in [place] to fill scripts. Patient does not have an established PCP and says she doesnt need one because she doesn't go to the doctor.  Patient's goal at discharge is to return home. CM will continue to follow.    Final Discharge Disposition Code 01 - home or self-care      Row Name 05/31/24 1203       Plan    Plan Ongoing- we are encouraging her to enter into AUD treatment    Plan Comments Comsult received, thank you.                   Discharge Codes    No documentation.                       JEREMÍAS Rice

## 2024-05-31 NOTE — PROGRESS NOTES
[facility] Medicine Services  PROGRESS NOTE    Patient Name: Gertrudis Perera  : 1970  MRN: [id number]    Date of Admission: 2024  Primary Care Physician: Provider, No Known    Subjective   Subjective     CC:  Alcohol withdrawal    HPI:    Feels shaky, having tremors  Not much appetite  Some blurry vision      Objective   Objective     Vital Signs:   Temp:  [98.2 °F (36.8 °C)-99.1 °F (37.3 °C)] 98.6 °F (37 °C)  Heart Rate:  [101-149] 111  Resp:  [18-25] 18  BP: (115-159)/(73-98) 141/90  Flow (L/min):  [2] 2     Physical Exam:  Appears fatigued, in bed  MM moist  Tachycardic  Breath sounds grossly clear bilaterally  Abd soft, NT  Anxious affect  Awake, speech clear  EOMI, visual fields full bilaterally  Trace to 1+ LE edema bilaterally    Results Reviewed:  LAB RESULTS:      Lab 24  0647 24  0411 24   WBC  --   --  5.27  --  5.47   HEMOGLOBIN  --   --  12.2  --  14.1   HEMATOCRIT  --   --  35.2  --  40.9   PLATELETS  --   --  111*  --  127*   NEUTROS ABS  --   --  3.14  --  2.85   IMMATURE GRANS (ABS)  --   --  0.04  --  0.03   LYMPHS ABS  --   --  1.59  --  2.00   MONOS ABS  --   --  0.42  --  0.47   EOS ABS  --   --  0.03  --  0.05   MCV  --   --  103.5*  --  103.5*   LACTATE 1.6 2.1* 2.4* 2.7*  --    PROTIME  --   --  13.5  --   --          Lab 24  1237 24  0933 24   SODIUM  --   --  146* 146*   POTASSIUM  --  3.4* 3.2* 2.9*   CHLORIDE  --   --  107 101   CO2  --   --  26.0 31.0*   ANION GAP  --   --  13.0 14.0   BUN  --   --  6 7   CREATININE  --   --  0.61 0.73   EGFR  --   --  108.4 99.7   GLUCOSE  --   --  125* 121*   CALCIUM  --   --  7.7* 8.7   MAGNESIUM  --   --  1.5* 1.7   PHOSPHORUS 1.5*  --  1.6*  --    TSH  --   --  1.160  --          Lab 24   TOTAL PROTEIN 7.1   ALBUMIN 3.8   GLOBULIN 3.3   ALT (SGPT) 87*   AST (SGOT) 289*   BILIRUBIN 0.9   ALK PHOS 232*          Lab 05/31/24  0108   PROTIME 13.5   INR 1.02                 Brief Urine Lab Results  (Last result in the past 365 days)        Color   Clarity   Blood   Leuk Est   Nitrite   Protein   CREAT   Urine HCG        05/30/24 1940 Orange   Turbid   Trace   Moderate (2+)   Positive   100 mg/dL (2+)                   Microbiology Results Abnormal       Procedure Component Value - Date/Time    Urine Culture - Urine, Urine, Clean Catch [[id number]]  (Normal) Collected: 05/30/24 1940    Lab Status: Preliminary result Specimen: Urine, Clean Catch Updated: 05/31/24 1057     Urine Culture Culture in progress            XR Chest 1 View    Result Date: 5/30/2024  XR CHEST 1 VW Date of Exam: 5/30/2024 9:41 PM EDT Indication: dyspnea, productive cough Comparison: None available. Findings: Lines: None Lungs: Poor respiratory effort accentuates the pulmonary vasculature and cardiomediastinal silhouette. No definite consolidation. Pleura: No pleural effusion or pneumothorax. Cardiomediastinum: The cardiomediastinal silhouette is normal Soft Tissues: Unremarkable. Bones: No acute osseous abnormality.     Impression: Impression: No acute abnormality. Electronically Signed: Jaiden Mcdonnell DO  5/30/2024 10:19 PM EDT  Workstation ID: [id number]    CT Abdomen Pelvis With Contrast    Result Date: 5/30/2024  CT ABDOMEN PELVIS W CONTRAST Date of Exam: 5/30/2024 8:04 PM EDT Indication: alcohol abuse abd/ left flank pain. Comparison: None available. Technique: Axial CT images were obtained of the abdomen and pelvis following the uneventful intravenous administration of 85 cc Isovue-300 IV contrast . Reconstructed coronal and sagittal images were also obtained. Automated exposure control and iterative construction methods were used. FINDINGS: Lung bases: No masses. No consolidation. Liver: Extensive decreased hepatic attenuation suggesting hepatic steatosis. Spleen:No masses. No perisplenic hematoma. Pancreas:No pancreatic masses. No evidence of  pancreatitis. Gallbladder and common bile duct: Prior cholecystectomy. Adrenal glands:No adrenal masses Kidneys and ureters:No kidney stones. No renal masses.No calculi present within the ureters. Normal caliber ureters. Urinary bladder: Urinary bladder wall thickening and urothelial enhancement. Small bowel:Normal caliber small bowel. Large bowel:No diverticulosis or diverticulitis. No large bowel masses are appreciated Appendix: Not seen however there is no evidence of appendicitis. GENITOURINARY: Prior hysterectomy Ascites or pneumoperitoneum:None. Adenopathy:None present Osseous structures: Degenerative changes of the hips and lumbar spine. Other findings: None     Impression: Hepatic steatosis. Urothelial enhancement of the urinary bladder suggesting cystitis. Electronically Signed: Deric Martinez MD  5/30/2024 8:20 PM EDT  Workstation ID: [id number]         Current medications:  Scheduled Meds:cefTRIAXone, 1,000 mg, Intravenous, Q24H  enoxaparin, 40 mg, Subcutaneous, Daily  folic acid, 1 mg, Oral, Daily  LORazepam, 2 mg, Oral, Q6H   Followed by  [START ON 6/1/2024] LORazepam, 1 mg, Oral, Q6H   Followed by  [START ON 6/2/2024] LORazepam, 1 mg, Oral, Q12H   Followed by  [START ON 6/3/2024] LORazepam, 1 mg, Oral, Daily  multivitamin with minerals, 1 tablet, Oral, Daily  nicotine, 1 patch, Transdermal, Nightly  nystatin, , Topical, Q12H  senna-docusate sodium, 2 tablet, Oral, BID  sodium chloride, 10 mL, Intravenous, Q12H  thiamine (B-1) IV, 200 mg, Intravenous, Q8H   Followed by  [START ON 6/5/2024] thiamine, 100 mg, Oral, Daily      Continuous Infusions:   PRN Meds:.•  senna-docusate sodium **AND** polyethylene glycol **AND** bisacodyl **AND** bisacodyl  •  Calcium Replacement - Follow Nurse / BPA Driven Protocol  •  ipratropium-albuterol  •  LORazepam **OR** midazolam **OR** LORazepam **OR** midazolam **OR** midazolam **OR** midazolam  •  Magnesium Standard Dose Replacement - Follow Nurse / BPA Driven Protocol  •   nitroglycerin  •  ondansetron ODT **OR** ondansetron  •  Phosphorus Replacement - Follow Nurse / BPA Driven Protocol  •  Potassium Replacement - Follow Nurse / BPA Driven Protocol  •  sodium chloride  •  sodium chloride    Assessment & Plan   Assessment & Plan     Active Hospital Problems    Diagnosis  POA   • **Alcohol intoxication [F10.929]  Yes   • COPD (chronic obstructive pulmonary disease) [J44.9]  Unknown   • Elevated LFTs [R79.89]  Unknown   • Hypokalemia [E87.6]  Unknown      Resolved Hospital Problems   No resolved problems to display.        Brief Hospital Course to date:  Gertrudis Perera is a 53 y.o. female with history of COPD and alcohol abuse presents with left flank pain, dysuria, fever, chills, fatigue, poor appetite, nausea, vomiting, dyspnea, productive cough, edema, dizziness/lightheadedness and generalized weakness.      Alcohol withdrawal  Hepatic steatosis  Alcoholic hepatitis  - drinks a pint of bourbon daily  - CIWA  - scheduled and PRN Benzodiazepines  - IV thiamine  - CMP am    UTI  Cystitis  - rocephin, follow cultures    COPD  Tobacco abuse  - PRN nebs  - nicotine replacement    Productive cough  - no infiltrate on CXR, no leukocytosis  - broaden workup as clinically indicated    Blurry vision  Lightheadedness/dizziness  - visual fields full on exam  - check CT head    Hypokalemia  - replace electrolytes PRN    Thrombocytopenia  - likely secondary to alcohol abuse    Tachycardia  - likely secondary to withdrawal, however will check EKG    Hyperglycemia  - A1c am    Generalized weakness  - PT/OT    Expected Discharge Location and Transportation:   Expected Discharge   Expected Discharge Date: 6/3/2024; Expected Discharge Time:      DVT prophylaxis:  Medical DVT prophylaxis orders are present.         AM-PAC 6 Clicks Score (PT): 14 (05/31/24 2061)    CODE STATUS:   Code Status and Medical Interventions:   Ordered at: 05/30/24 7632     Level Of Support Discussed With:    Patient     Code  Status (Patient has no pulse and is not breathing):    CPR (Attempt to Resuscitate)     Medical Interventions (Patient has pulse or is breathing):    Full Support       Nathan Paige MD  05/31/24

## 2024-06-01 LAB
ALBUMIN SERPL-MCNC: 2.9 G/DL (ref 3.5–5.2)
ALBUMIN/GLOB SERPL: 1 G/DL
ALP SERPL-CCNC: 203 U/L (ref 39–117)
ALT SERPL W P-5'-P-CCNC: 63 U/L (ref 1–33)
ANION GAP SERPL CALCULATED.3IONS-SCNC: 12 MMOL/L (ref 5–15)
AST SERPL-CCNC: 177 U/L (ref 1–32)
BILIRUB SERPL-MCNC: 1.2 MG/DL (ref 0–1.2)
BUN SERPL-MCNC: 2 MG/DL (ref 6–20)
BUN/CREAT SERPL: 3.4 (ref 7–25)
CALCIUM SPEC-SCNC: 8.2 MG/DL (ref 8.6–10.5)
CHLORIDE SERPL-SCNC: 100 MMOL/L (ref 98–107)
CO2 SERPL-SCNC: 26 MMOL/L (ref 22–29)
CREAT SERPL-MCNC: 0.59 MG/DL (ref 0.57–1)
DEPRECATED RDW RBC AUTO: 54.4 FL (ref 37–54)
EGFRCR SERPLBLD CKD-EPI 2021: 109.3 ML/MIN/1.73
ERYTHROCYTE [DISTWIDTH] IN BLOOD BY AUTOMATED COUNT: 14.1 % (ref 12.3–15.4)
GLOBULIN UR ELPH-MCNC: 2.8 GM/DL
GLUCOSE SERPL-MCNC: 83 MG/DL (ref 65–99)
HBA1C MFR BLD: 5.2 % (ref 4.8–5.6)
HCT VFR BLD AUTO: 36.9 % (ref 34–46.6)
HGB BLD-MCNC: 12.4 G/DL (ref 12–15.9)
MAGNESIUM SERPL-MCNC: 1.9 MG/DL (ref 1.6–2.6)
MCH RBC QN AUTO: 35 PG (ref 26.6–33)
MCHC RBC AUTO-ENTMCNC: 33.6 G/DL (ref 31.5–35.7)
MCV RBC AUTO: 104.2 FL (ref 79–97)
PHOSPHATE SERPL-MCNC: 3.3 MG/DL (ref 2.5–4.5)
PLATELET # BLD AUTO: 102 10*3/MM3 (ref 140–450)
PMV BLD AUTO: 11.1 FL (ref 6–12)
POTASSIUM SERPL-SCNC: 3.8 MMOL/L (ref 3.5–5.2)
PROT SERPL-MCNC: 5.7 G/DL (ref 6–8.5)
QT INTERVAL: 340 MS
QTC INTERVAL: 457 MS
RBC # BLD AUTO: 3.54 10*6/MM3 (ref 3.77–5.28)
SODIUM SERPL-SCNC: 138 MMOL/L (ref 136–145)
WBC NRBC COR # BLD AUTO: 5.29 10*3/MM3 (ref 3.4–10.8)

## 2024-06-01 PROCEDURE — 85027 COMPLETE CBC AUTOMATED: CPT | Performed by: INTERNAL MEDICINE

## 2024-06-01 PROCEDURE — 25010000002 ENOXAPARIN PER 10 MG: Performed by: NURSE PRACTITIONER

## 2024-06-01 PROCEDURE — 83735 ASSAY OF MAGNESIUM: CPT | Performed by: INTERNAL MEDICINE

## 2024-06-01 PROCEDURE — 83036 HEMOGLOBIN GLYCOSYLATED A1C: CPT | Performed by: INTERNAL MEDICINE

## 2024-06-01 PROCEDURE — 25010000002 THIAMINE HCL 200 MG/2ML SOLUTION: Performed by: NURSE PRACTITIONER

## 2024-06-01 PROCEDURE — 25810000003 LACTATED RINGERS PER 1000 ML: Performed by: INTERNAL MEDICINE

## 2024-06-01 PROCEDURE — 97166 OT EVAL MOD COMPLEX 45 MIN: CPT

## 2024-06-01 PROCEDURE — 25010000002 CEFTRIAXONE PER 250 MG: Performed by: NURSE PRACTITIONER

## 2024-06-01 PROCEDURE — 84100 ASSAY OF PHOSPHORUS: CPT | Performed by: INTERNAL MEDICINE

## 2024-06-01 PROCEDURE — 99232 SBSQ HOSP IP/OBS MODERATE 35: CPT | Performed by: INTERNAL MEDICINE

## 2024-06-01 PROCEDURE — 80053 COMPREHEN METABOLIC PANEL: CPT | Performed by: INTERNAL MEDICINE

## 2024-06-01 PROCEDURE — 97162 PT EVAL MOD COMPLEX 30 MIN: CPT | Performed by: PHYSICAL THERAPIST

## 2024-06-01 RX ADMIN — Medication 1 TABLET: at 08:38

## 2024-06-01 RX ADMIN — LORAZEPAM 1 MG: 1 TABLET ORAL at 17:26

## 2024-06-01 RX ADMIN — SODIUM CHLORIDE 1000 MG: 900 INJECTION INTRAVENOUS at 21:05

## 2024-06-01 RX ADMIN — LORAZEPAM 1 MG: 1 TABLET ORAL at 08:38

## 2024-06-01 RX ADMIN — FOLIC ACID 1 MG: 1 TABLET ORAL at 08:38

## 2024-06-01 RX ADMIN — LORAZEPAM 1 MG: 1 TABLET ORAL at 14:38

## 2024-06-01 RX ADMIN — Medication 10 ML: at 21:06

## 2024-06-01 RX ADMIN — LORAZEPAM 1 MG: 1 TABLET ORAL at 01:23

## 2024-06-01 RX ADMIN — NYSTATIN: 100000 POWDER TOPICAL at 21:05

## 2024-06-01 RX ADMIN — LORAZEPAM 1 MG: 1 TABLET ORAL at 23:11

## 2024-06-01 RX ADMIN — LORAZEPAM 1 MG: 1 TABLET ORAL at 13:10

## 2024-06-01 RX ADMIN — Medication 1 PATCH: at 21:10

## 2024-06-01 RX ADMIN — LORAZEPAM 1 MG: 1 TABLET ORAL at 03:26

## 2024-06-01 RX ADMIN — ENOXAPARIN SODIUM 40 MG: 100 INJECTION SUBCUTANEOUS at 08:37

## 2024-06-01 RX ADMIN — THIAMINE HYDROCHLORIDE 200 MG: 100 INJECTION, SOLUTION INTRAMUSCULAR; INTRAVENOUS at 08:38

## 2024-06-01 RX ADMIN — LORAZEPAM 1 MG: 1 TABLET ORAL at 21:05

## 2024-06-01 RX ADMIN — THIAMINE HYDROCHLORIDE 200 MG: 100 INJECTION, SOLUTION INTRAMUSCULAR; INTRAVENOUS at 17:26

## 2024-06-01 RX ADMIN — SODIUM CHLORIDE, POTASSIUM CHLORIDE, SODIUM LACTATE AND CALCIUM CHLORIDE 75 ML/HR: 600; 310; 30; 20 INJECTION, SOLUTION INTRAVENOUS at 08:38

## 2024-06-01 RX ADMIN — SENNOSIDES AND DOCUSATE SODIUM 2 TABLET: 8.6; 5 TABLET ORAL at 21:05

## 2024-06-01 NOTE — THERAPY EVALUATION
Patient Name: Gertrudis Perera  : 1970    MRN: [id number]                              Today's Date: 2024       Admit Date: 2024    Visit Dx:     ICD-10-CM ICD-9-CM   1. Alcohol withdrawal syndrome with complication  F10.939 291.81   2. Acute cystitis without hematuria  N30.00 595.0   3. Hypokalemia  E87.6 276.8     Patient Active Problem List   Diagnosis    Alcohol intoxication    COPD (chronic obstructive pulmonary disease)    Elevated LFTs    Hypokalemia     Past Medical History:   Diagnosis Date    COPD (chronic obstructive pulmonary disease)      Past Surgical History:   Procedure Laterality Date    CHOLECYSTECTOMY      HYSTERECTOMY        General Information       Row Name 24 1510          OT Time and Intention    Document Type evaluation  -VIKKI     Mode of Treatment occupational therapy  -       Row Name 24 1510          General Information    Patient Profile Reviewed yes  -VIKKI     Prior Level of Function independent:;ADL's;bed mobility;transfer;all household mobility;community mobility;home management;driving;shopping;using stairs  -VIKKI     Existing Precautions/Restrictions fall;oxygen therapy device and L/min;seizures  -VIKKI     Barriers to Rehab medically complex;ineffective coping  -VIKKI       Row Name 24 1510          Living Environment    People in Home alone  -       Row Name 24 1510          Home Main Entrance    Number of Stairs, Main Entrance four  -VIKKI     Stair Railings, Main Entrance railing on right side (ascending)  -VIKKI       Row Name 24 1510          Stairs Within Home, Primary    Number of Stairs, Within Home, Primary none  -VIKKI       Row Name 24 1510          Cognition    Orientation Status (Cognition) oriented x 3  -VIKKI       Row Name 24 1510          Safety Issues, Functional Mobility    Safety Issues Affecting Function (Mobility) awareness of need for assistance;insight into deficits/self-awareness;judgment;problem-solving;safety  precaution awareness;safety precautions follow-through/compliance;sequencing abilities  -VIKKI     Impairments Affecting Function (Mobility) balance;coordination;endurance/activity tolerance;motor planning;postural/trunk control;strength  -VIKKI               User Key  (r) = Recorded By, (t) = Taken By, (c) = Cosigned By      Initials Name Provider Type    Madison Howe OT Occupational Therapist                     Mobility/ADL's       Row Name 06/01/24 1513          Bed Mobility    Bed Mobility supine-sit;sit-supine;scooting/bridging  -VIKKI     Scooting/Bridging [place] (Bed Mobility) dependent (less than 25% patient effort);2 person assist  -VIKKI     Supine-Sit [place] (Bed Mobility) moderate assist (50% patient effort);1 person assist;verbal cues  -VIKKI     Sit-Supine [place] (Bed Mobility) moderate assist (50% patient effort);1 person assist;verbal cues  -VIKKI     Assistive Device (Bed Mobility) bed rails;draw sheet;head of bed elevated  -       Row Name 06/01/24 1513          Transfers    Transfers sit-stand transfer;toilet transfer  -       Row Name 06/01/24 1513          Sit-Stand Transfer    Sit-Stand [place] (Transfers) moderate assist (50% patient effort);1 person assist;verbal cues  -       Row Name 06/01/24 1513          Toilet Transfer    Type (Toilet Transfer) stand pivot/stand step;stand-sit;sit-stand  -     [place] Level (Toilet Transfer) moderate assist (50% patient effort);1 person assist;verbal cues  -     Assistive Device (Toilet Transfer) commode, bedside without drop arms  -       Row Name 06/01/24 1513          Activities of Daily Living    BADL Assessment/Intervention toileting;grooming  -       Row Name 06/01/24 1513          Toileting Assessment/Training    [place] Level (Toileting) adjust/manage clothing;perform perineal hygiene;moderate assist (50% patient effort)  -     Assistive Devices (Toileting) commode, bedside without drop arms  -     Position  (Toileting) supported standing  -VIKKI     Comment, (Toileting) following BM  -VIKKI       Row Name 06/01/24 1513          Grooming Assessment/Training    [place] Level (Grooming) wash face, hands;set up  -VIKKI     Oral Care patient refused intervention  -VIKKI     Position (Grooming) sitting up in bed  -VIKKI               User Key  (r) = Recorded By, (t) = Taken By, (c) = Cosigned By      Initials Name Provider Type    Madison Howe OT Occupational Therapist                   Obj/Interventions       Row Name 06/01/24 1517          Sensory Assessment (Somatosensory)    Sensory Assessment (Somatosensory) UE sensation intact  -VIKKI       Row Name 06/01/24 1517          Vision Assessment/Intervention    Visual Impairment/Limitations WFL  -VIKKI       Row Name 06/01/24 1517          Range of Motion Comprehensive    General Range of Motion bilateral upper extremity ROM WFL  -       Row Name 06/01/24 1517          Strength Comprehensive (MMT)    Comment, General Manual Muscle Testing (MMT) Assessment BUE's grossly 4-/5  -VIKKI       Row Name 06/01/24 1517          Balance    Balance Assessment sitting static balance;sitting dynamic balance;standing static balance;standing dynamic balance  -VIKKI     Static Sitting Balance supervision  -VIKKI     Dynamic Sitting Balance minimal assist  -VIKKI     Position, Sitting Balance unsupported;sitting edge of bed  -VIKKI     Static Standing Balance moderate assist;1-person assist;verbal cues  -VIKKI     Dynamic Standing Balance moderate assist;1-person assist;verbal cues  -VIKKI     Position/Device Used, Standing Balance unsupported  -VIKKI     Balance Interventions standing;occupation based/functional task  -VIKKI     Comment, Balance MaxA for toileting tasks in standing  -VIKKI               User Key  (r) = Recorded By, (t) = Taken By, (c) = Cosigned By      Initials Name Provider Type    Madison Howe OT Occupational Therapist                   Goals/Plan       Row Name 06/01/24 1524          Transfer Goal 1  (OT)    Activity/Assistive Device (Transfer Goal 1, OT) sit-to-stand/stand-to-sit;shower chair  -VIKKI     [place] Level/Cues Needed (Transfer Goal 1, OT) contact guard required  -VIKKI     Time Frame (Transfer Goal 1, OT) long term goal (LTG);10 days  -VIKKI     Progress/Outcome (Transfer Goal 1, OT) new goal  -VIKKI       Row Name 06/01/24 1523          Toileting Goal 1 (OT)    Activity/Device (Toileting Goal 1, OT) adjust/manage clothing;perform perineal hygiene  -VIKKI     [place] Level/Cues Needed (Toileting Goal 1, OT) minimum assist (75% or more patient effort)  -VIKKI     Time Frame (Toileting Goal 1, OT) short term goal (STG);1 week  -VIKKI     Progress/Outcome (Toileting Goal 1, OT) new goal  -VIKKI       Row Name 06/01/24 1521          Therapy Assessment/Plan (OT)    Planned Therapy Interventions (OT) activity tolerance training;BADL retraining;functional balance retraining;occupation/activity based interventions;patient/caregiver education/training;ROM/therapeutic exercise;strengthening exercise;transfer/mobility retraining  -VIKKI               User Key  (r) = Recorded By, (t) = Taken By, (c) = Cosigned By      Initials Name Provider Type    Madison Howe, [place] Occupational Therapist                   Clinical Impression       Row Name 06/01/24 1520          Pain Assessment    Pretreatment Pain Rating 0/10 - no pain  -VIKKI     Posttreatment Pain Rating 0/10 - no pain  -VIKKI       Row Name 06/01/24 1526          Plan of Care Review    Plan of Care Reviewed With patient  -VIKKI     Outcome Evaluation OT eval completed. Pt presents with significant weakness, impaired balance, and poor activity tolerance impacting ADL's. Pt ModA for functional transfers, MaxA for toileting tasks, BELL for grooming while sitting up in bed. IP OT services warranted. Recommend IPR at discharge.  -VIKKI       Row Name 06/01/24 1523          Therapy Assessment/Plan (OT)    Patient/Family Therapy Goal Statement (OT) To get stronger  -VIKKI     Rehab Potential  (OT) good, to achieve stated therapy goals  -VIKKI     Criteria for Skilled Therapeutic Interventions Met (OT) yes;meets criteria;skilled treatment is necessary  -VIKKI     Therapy Frequency (OT) daily  -VIKKI     Predicted Duration of Therapy Intervention (OT) 10 days  -VIKKI       Row Name 06/01/24 1520          Therapy Plan Review/Discharge Plan (OT)    Anticipated Discharge Disposition (OT) inpatient rehabilitation facility  -       Row Name 06/01/24 1520          Vital Signs    Pre Systolic BP Rehab 143  -VIKKI     Pre Treatment Diastolic BP 98  -VIKKI     Post Systolic BP Rehab 144  -VIKKI     Post Treatment Diastolic   -VIKKI     Posttreatment Heart Rate (beats/min) 107  -VIKKI     Pre SpO2 (%) 95  -VIKKI     O2 Delivery Pre Treatment nasal cannula  -VIKKI     O2 Delivery Intra Treatment room air  -VIKKI     Post SpO2 (%) 98  -VIKKI     O2 Delivery Post Treatment room air  -VIKKI     Pre Patient Position Supine  -VIKKI     Intra Patient Position Standing  -VIKKI     Post Patient Position Supine  -VIKKI       Row Name 06/01/24 1520          Positioning and Restraints    Pre-Treatment Position in bed  -VIKKI     Post Treatment Position bed  -VIKKI     In Bed notified nsg;fowlers;call light within reach;encouraged to call for assist;exit alarm on  -VIKKI               User Key  (r) = Recorded By, (t) = Taken By, (c) = Cosigned By      Initials Name Provider Type    Madison Howe, OT Occupational Therapist                   Outcome Measures       Row Name 06/01/24 1525          How much help from another is currently needed...    Putting on and taking off regular lower body clothing? 1  -VIKKI     Bathing (including washing, rinsing, and drying) 2  -VIKKI     Toileting (which includes using toilet bed pan or urinal) 2  -VIKKI     Putting on and taking off regular upper body clothing 3  -VIKKI     Taking care of personal grooming (such as brushing teeth) 3  -VIKKI     Eating meals 3  -VIKKI     AM-PAC 6 Clicks Score (OT) 14  -VIKKI       Row Name 06/01/24 1452          How much help  from another person do you currently need...    Turning from your back to your side while in flat bed without using bedrails? 3  -LM     Moving from lying on back to sitting on the side of a flat bed without bedrails? 2  -LM     Moving to and from a bed to a chair (including a wheelchair)? 2  -LM     Standing up from a chair using your arms (e.g., wheelchair, bedside chair)? 2  -LM     Climbing 3-5 steps with a railing? 2  -LM     To walk in hospital room? 2  -LM     AM-PAC 6 Clicks Score (PT) 13  -LM     Highest Level of Mobility Goal 4 --> Transfer to chair/commode  -LM       Row Name 06/01/24 1525 06/01/24 1452       Functional Assessment    Outcome Measure Options AM-PAC 6 Clicks Daily Activity (OT)  -VIKKI AM-PAC 6 Clicks Basic Mobility (PT)  -LM              User Key  (r) = Recorded By, (t) = Taken By, (c) = Cosigned By      Initials Name Provider Type    Maya Roberts, PT Physical Therapist    Madison Howe, OT Occupational Therapist                    Occupational Therapy Education       Title: PT OT SLP Therapies (In Progress)       Topic: Occupational Therapy (In Progress)       Point: ADL training (Done)       Description:   Instruct learner(s) on proper safety adaptation and remediation techniques during self care or transfers.   Instruct in proper use of assistive devices.                  Learning Progress Summary             Patient Acceptance, E, VU by VIKKI at 6/1/2024 2186    Comment: OT POC                         Point: Home exercise program (Not Started)       Description:   Instruct learner(s) on appropriate technique for monitoring, assisting and/or progressing therapeutic exercises/activities.                  Learner Progress:  Not documented in this visit.              Point: Precautions (Not Started)       Description:   Instruct learner(s) on prescribed precautions during self-care and functional transfers.                  Learner Progress:  Not documented in this visit.               Point: Body mechanics (Not Started)       Description:   Instruct learner(s) on proper positioning and spine alignment during self-care, functional mobility activities and/or exercises.                  Learner Progress:  Not documented in this visit.                              User Key       Initials Effective Dates Name Provider Type Discipline     06/16/21 -  Madison Mcmillan, OT Occupational Therapist OT                  OT Recommendation and Plan  Planned Therapy Interventions (OT): activity tolerance training, BADL retraining, functional balance retraining, occupation/activity based interventions, patient/caregiver education/training, ROM/therapeutic exercise, strengthening exercise, transfer/mobility retraining  Therapy Frequency (OT): daily  Plan of Care Review  Plan of Care Reviewed With: patient  Outcome Evaluation: OT eval completed. Pt presents with significant weakness, impaired balance, and poor activity tolerance impacting ADL's. Pt ModA for functional transfers, MaxA for toileting tasks, BELL for grooming while sitting up in bed. IP OT services warranted. Recommend IPR at discharge.     Time Calculation:   Evaluation Complexity (OT)  Review Occupational Profile/Medical/Therapy History Complexity: expanded/moderate complexity  Assessment, Occupational Performance/Identification of Deficit Complexity: 3-5 performance deficits  Clinical Decision Making Complexity (OT): detailed assessment/moderate complexity  Overall Complexity of Evaluation (OT): moderate complexity     Time Calculation- OT       Row Name 06/01/24 1430             Time Calculation- OT    OT Start Time 1430  -VIKKI      OT Received On 06/01/24  -VIKKI      OT Goal Re-Cert Due Date 06/11/24  -VIKKI         Untimed Charges    OT Eval/Re-eval Minutes 48  -VIKKI         Total Minutes    Untimed Charges Total Minutes 48  -VIKKI       Total Minutes 48  -VIKKI                User Key  (r) = Recorded By, (t) = Taken By, (c) = Cosigned By      Initials Name  Provider Type    VIKKI Madison Mcmillan OT Occupational Therapist                  Therapy Charges for Today       Code Description Service Date Service Provider Modifiers Qty    [id number] HC OT EVAL MOD COMPLEXITY 4 6/1/2024 Madison Mcmillan OT GO 1                 Madison Mcmillan OT  6/1/2024

## 2024-06-01 NOTE — PLAN OF CARE
Goal Outcome Evaluation:  Plan of Care Reviewed With: patient           Outcome Evaluation: PT evaluation completed.  Pt required ModA with all mobility including a stand pivot transfer to/from [facility].  Pt presents below baseline function d/t weakness, decreased activity tolerance, and gait instability.  Recommend inpt rehab at d/c.      Anticipated Discharge Disposition (PT): inpatient rehabilitation facility

## 2024-06-01 NOTE — THERAPY EVALUATION
Patient Name: Gertrudis Perera  : 1970    MRN: [id number]                              Today's Date: 2024       Admit Date: 2024    Visit Dx:     ICD-10-CM ICD-9-CM   1. Alcohol withdrawal syndrome with complication  F10.939 291.81   2. Acute cystitis without hematuria  N30.00 595.0   3. Hypokalemia  E87.6 276.8     Patient Active Problem List   Diagnosis    Alcohol intoxication    COPD (chronic obstructive pulmonary disease)    Elevated LFTs    Hypokalemia     Past Medical History:   Diagnosis Date    COPD (chronic obstructive pulmonary disease)      Past Surgical History:   Procedure Laterality Date    CHOLECYSTECTOMY      HYSTERECTOMY        General Information       Row Name 24 1437          Physical Therapy Time and Intention    Document Type evaluation  -LM     Mode of Treatment physical therapy  -       Row Name 24 1437          General Information    Patient Profile Reviewed yes  -LM     Prior Level of Function independent:;all household mobility;gait;ADL's  -LM     Existing Precautions/Restrictions fall;oxygen therapy device and L/min;seizures  -LM     Barriers to Rehab medically complex;ineffective coping  -       Row Name 24 1437          Living Environment    People in Home alone  -       Row Name 24 1437          Home Main Entrance    Number of Stairs, Main Entrance four  -LM     Stair Railings, Main Entrance railing on right side (ascending)  -       Row Name 24 1437          Stairs Within Home, Primary    Number of Stairs, Within Home, Primary none  -       Row Name 24 1437          Cognition    Orientation Status (Cognition) oriented x 4  -LM       Row Name 24 1437          Safety Issues, Functional Mobility    Safety Issues Affecting Function (Mobility) awareness of need for assistance;insight into deficits/self-awareness;judgment;safety precaution awareness;safety precautions follow-through/compliance;sequencing abilities  -LM      Impairments Affecting Function (Mobility) balance;coordination;endurance/activity tolerance;strength  -LM               User Key  (r) = Recorded By, (t) = Taken By, (c) = Cosigned By      Initials Name Provider Type    Maya Roberts PT Physical Therapist                   Mobility       Row Name 06/01/24 1442          Bed Mobility    Bed Mobility supine-sit;sit-supine  -LM     Supine-Sit [place] (Bed Mobility) moderate assist (50% patient effort);1 person assist;verbal cues  -LM     Sit-Supine [place] (Bed Mobility) moderate assist (50% patient effort);1 person assist;verbal cues  -LM     Assistive Device (Bed Mobility) bed rails;head of bed elevated  -LM       Row Name 06/01/24 1442          Bed-Chair Transfer    Bed-Chair [place] (Transfers) moderate assist (50% patient effort);1 person assist;verbal cues  -LM     Comment, (Bed-Chair Transfer) 2 stand pivot transfers completed from bed-->BSC-->bed.  ModA needed with pericare.  Pt very tremulous during activity with elevated HR.  -LM       Row Name 06/01/24 1442          Sit-Stand Transfer    Sit-Stand [place] (Transfers) moderate assist (50% patient effort);1 person assist;verbal cues  -LM       Row Name 06/01/24 1442          Gait/Stairs (Locomotion)    [place] Level (Gait) unable to assess  -LM     Comment, (Gait/Stairs) Not safe to attempt at this time.  -LM               User Key  (r) = Recorded By, (t) = Taken By, (c) = Cosigned By      Initials Name Provider Type    Maya Roberts PT Physical Therapist                   Obj/Interventions       Row Name 06/01/24 1445          Range of Motion Comprehensive    General Range of Motion bilateral lower extremity ROM WFL  -LM       Row Name 06/01/24 1445          Strength Comprehensive (MMT)    General Manual Muscle Testing (MMT) Assessment lower extremity strength deficits identified  -LM     Comment, General Manual Muscle Testing (MMT) Assessment BLEs grossly 4-/5 throughout  -LM        Row Name 06/01/24 1445          Balance    Balance Assessment sitting static balance;standing static balance;standing dynamic balance  -LM     Static Sitting Balance standby assist  -LM     Position, Sitting Balance unsupported;sitting edge of bed  -LM     Static Standing Balance moderate assist;1-person assist  -LM     Dynamic Standing Balance moderate assist;1-person assist  -LM       Row Name 06/01/24 1445          Sensory Assessment (Somatosensory)    Sensory Assessment (Somatosensory) LE sensation intact  -LM               User Key  (r) = Recorded By, (t) = Taken By, (c) = Cosigned By      Initials Name Provider Type    LM Maya Martin, PT Physical Therapist                   Goals/Plan       Row Name 06/01/24 1451          Bed Mobility Goal 1 (PT)    Activity/Assistive Device (Bed Mobility Goal 1, PT) sit to supine/supine to sit  -LM     [place] Level/Cues Needed (Bed Mobility Goal 1, PT) contact guard required  -LM     Time Frame (Bed Mobility Goal 1, PT) short term goal (STG);5 days  -LM       Row Name 06/01/24 1451          Transfer Goal 1 (PT)    Activity/Assistive Device (Transfer Goal 1, PT) bed-to-chair/chair-to-bed  -LM     [place] Level/Cues Needed (Transfer Goal 1, PT) standby assist  -LM     Time Frame (Transfer Goal 1, PT) long term goal (LTG);10 days  -LM       Row Name 06/01/24 1451          Gait Training Goal 1 (PT)    Activity/Assistive Device (Gait Training Goal 1, PT) gait (walking locomotion);assistive device use  -LM     [place] Level (Gait Training Goal 1, PT) standby assist  -LM     Distance (Gait Training Goal 1, PT) 200 feet  -LM     Time Frame (Gait Training Goal 1, PT) long term goal (LTG);10 days  -LM       Row Name 06/01/24 1451          Therapy Assessment/Plan (PT)    Planned Therapy Interventions (PT) balance training;bed mobility training;gait training;home exercise program;motor coordination training;neuromuscular re-education;patient/family  education;postural re-education;ROM (range of motion);stair training;strengthening;stretching;transfer training  -LM               User Key  (r) = Recorded By, (t) = Taken By, (c) = Cosigned By      Initials Name Provider Type    LM Maya Martin, PT Physical Therapist                   Clinical Impression       Row Name 06/01/24 1445          Pain    Pretreatment Pain Rating 0/10 - no pain  -LM     Posttreatment Pain Rating 0/10 - no pain  -LM       Row Name 06/01/24 1445          Plan of Care Review    Plan of Care Reviewed With patient  -LM     Outcome Evaluation PT evaluation completed.  Pt required ModA with all mobility including a stand pivot transfer to/from [facility].  Pt presents below baseline function d/t weakness, decreased activity tolerance, and gait instability.  Recommend inpt rehab at d/c.  -[facility] Name 06/01/24 1445          Therapy Assessment/Plan (PT)    Patient/Family Therapy Goals Statement (PT) to return to [facility]  -     Rehab Potential (PT) good, to achieve stated therapy goals  -     Criteria for Skilled Interventions Met (PT) yes;meets criteria  -LM     Therapy Frequency (PT) daily  -LM     Predicted Duration of Therapy Intervention (PT) 10 days  -LM       Row Name 06/01/24 1445          Vital Signs    Pretreatment Heart Rate (beats/min) 113  -LM     Posttreatment Heart Rate (beats/min) 106  -LM     Pre SpO2 (%) 95  -LM     O2 Delivery Pre Treatment supplemental O2  -LM     Post SpO2 (%) 95  -LM     O2 Delivery Post Treatment supplemental O2  -LM     Pre Patient Position Supine  -LM     Post Patient Position Supine  -LM       Row Name 06/01/24 1445          Positioning and Restraints    Pre-Treatment Position in bed  -LM     Post Treatment Position bed  -LM     In Bed supine;call light within reach;encouraged to call for assist;exit alarm on;notified nsg  -LM               User Key  (r) = Recorded By, (t) = Taken By, (c) = Cosigned By      Initials Name Provider Type    Maya Roberts  PT Physical Therapist                   Outcome Measures       Row Name 06/01/24 1452          How much help from another person do you currently need...    Turning from your back to your side while in flat bed without using bedrails? 3  -LM     Moving from lying on back to sitting on the side of a flat bed without bedrails? 2  -LM     Moving to and from a bed to a chair (including a wheelchair)? 2  -LM     Standing up from a chair using your arms (e.g., wheelchair, bedside chair)? 2  -LM     Climbing 3-5 steps with a railing? 2  -LM     To walk in hospital room? 2  -LM     AM-PAC 6 Clicks Score (PT) 13  -LM     Highest Level of Mobility Goal 4 --> Transfer to chair/commode  -LM       Row Name 06/01/24 1452          Functional Assessment    Outcome Measure Options AM-PAC 6 Clicks Basic Mobility (PT)  -               User Key  (r) = Recorded By, (t) = Taken By, (c) = Cosigned By      Initials Name Provider Type    LM Maya Martin, LISA Physical Therapist                                 Physical Therapy Education       Title: PT OT SLP Therapies (In Progress)       Topic: Physical Therapy (In Progress)       Point: Mobility training (In Progress)       Learning Progress Summary             Patient Acceptance, E, NR by  at 6/1/2024 1453                         Point: Home exercise program (Not Started)       Learner Progress:  Not documented in this visit.              Point: Precautions (In Progress)       Learning Progress Summary             Patient Acceptance, E, NR by  at 6/1/2024 1453                                         User Key       Initials Effective Dates Name Provider Type Discipline     07/11/23 -  Maya Martin, LISA Physical Therapist PT                  PT Recommendation and Plan  Planned Therapy Interventions (PT): balance training, bed mobility training, gait training, home exercise program, motor coordination training, neuromuscular re-education, patient/family education, postural  re-education, ROM (range of motion), stair training, strengthening, stretching, transfer training  Plan of Care Reviewed With: patient  Outcome Evaluation: PT evaluation completed.  Pt required ModA with all mobility including a stand pivot [place] [facility]  Pt presents below baseline function d/t weakness, decreased activity tolerance, and gait instability.  Recommend inpt rehab at d/c.     Time Calculation:   PT Evaluation Complexity  History, PT Evaluation Complexity: 3 or more personal factors and/or comorbidities  Examination of Body Systems (PT Eval Complexity): total of 3 or more elements  Clinical Presentation (PT Evaluation Complexity): evolving  Clinical Decision Making (PT Evaluation Complexity): moderate complexity  Overall Complexity (PT Evaluation Complexity): moderate complexity     PT Charges       Row Name 06/01/24 1454             Time Calculation    Start Time 1412  -LM      PT Received On 06/01/24  -LM      PT Goal Re-Cert Due Date 06/11/24  -LM         Untimed Charges    PT Eval/Re-eval Minutes 46  -LM         Total Minutes    Untimed Charges Total Minutes 46  -LM       Total Minutes 46  -LM                User Key  (r) = Recorded By, (t) = Taken By, (c) = Cosigned By      Initials Name Provider Type    LM Maya Martin, PT Physical Therapist                  Therapy Charges for Today       Code Description Service Date Service Provider Modifiers Qty    85997717793 HC PT EVAL MOD COMPLEXITY 4 6/1/2024 Maya Martin, PT GP 1            PT G-Codes  Outcome Measure Options: AM-PAC 6 Clicks Basic Mobility (PT)  AM-PAC 6 Clicks Score (PT): 13  PT Discharge Summary  Anticipated Discharge Disposition (PT): inpatient rehabilitation facility    Maya Martin PT  6/1/2024

## 2024-06-01 NOTE — PROGRESS NOTES
[facility] Medicine Services  PROGRESS NOTE    Patient Name: Gertrudis Perera  : 1970  MRN: [id number]    Date of Admission: 2024  Primary Care Physician: Provider, No Known    Subjective   Subjective     CC:  Alcohol withdrawal    HPI:    Sleeping this morning.  No complaints upon awakening.  Denies current tremors or hallucinations.      Objective   Objective     Vital Signs:   Temp:  [97.9 °F (36.6 °C)-98.6 °F (37 °C)] 98.6 °F (37 °C)  Heart Rate:  [114] 114  Resp:  [18-22] 18  BP: (138-149)/(90-98) 138/94  Flow (L/min):  [2-3] 2     Physical Exam:    NAD, in bed  MM moist  RRR  CTAB  Abd soft, NT  Somnolent, awakens to light touch, speech clear  Flat affect    Results Reviewed:  LAB RESULTS:      Lab 24  0656 24  0647 24  0411 24   WBC 5.29  --   --  5.27  --  5.47   HEMOGLOBIN 12.4  --   --  12.2  --  14.1   HEMATOCRIT 36.9  --   --  35.2  --  40.9   PLATELETS 102*  --   --  111*  --  127*   NEUTROS ABS  --   --   --  3.14  --  2.85   IMMATURE GRANS (ABS)  --   --   --  0.04  --  0.03   LYMPHS ABS  --   --   --  1.59  --  2.00   MONOS ABS  --   --   --  0.42  --  0.47   EOS ABS  --   --   --  0.03  --  0.05   .2*  --   --  103.5*  --  103.5*   LACTATE  --  1.6 2.1* 2.4* 2.7*  --    PROTIME  --   --   --  13.5  --   --          Lab 24  0656 24  1237 24  0933 24   SODIUM 138  --   --   --  146* 146*   POTASSIUM 3.8 4.0  --  3.4* 3.2* 2.9*   CHLORIDE 100  --   --   --  107 101   CO2 26.0  --   --   --  26.0 31.0*   ANION GAP 12.0  --   --   --  13.0 14.0   BUN 2*  --   --   --  6 7   CREATININE 0.59  --   --   --  0.61 0.73   EGFR 109.3  --   --   --  108.4 99.7   GLUCOSE 83  --   --   --  125* 121*   CALCIUM 8.2*  --   --   --  7.7* 8.7   MAGNESIUM 1.9  --   --   --  1.5* 1.7   PHOSPHORUS 3.3 1.9* 1.5*  --  1.6*  --    HEMOGLOBIN A1C 5.20  --   --    --   --   --    TSH  --   --   --   --  1.160  --          Lab 06/01/24  0656 05/30/24 1940   TOTAL PROTEIN 5.7* 7.1   ALBUMIN 2.9* 3.8   GLOBULIN 2.8 3.3   ALT (SGPT) 63* 87*   AST (SGOT) 177* 289*   BILIRUBIN 1.2 0.9   ALK PHOS 203* 232*         Lab 05/31/24  0108   PROTIME 13.5   INR 1.02                 Brief Urine Lab Results  (Last result in the past 365 days)        Color   Clarity   Blood   Leuk Est   Nitrite   Protein   CREAT   Urine HCG        05/30/24 1940 Orange   Turbid   Trace   Moderate (2+)   Positive   100 mg/dL (2+)                   Microbiology Results Abnormal       Procedure Component Value - Date/Time    Blood Culture - Blood, Arm, Right [[id number]]  (Normal) Collected: 05/30/24 2030    Lab Status: Preliminary result Specimen: Blood from Arm, Right Updated: 05/31/24 2101     Blood Culture No growth at 24 hours    Blood Culture - Blood, Hand, Right [[id number]]  (Normal) Collected: 05/30/24 2050    Lab Status: Preliminary result Specimen: Blood from Hand, Right Updated: 05/31/24 2101     Blood Culture No growth at 24 hours    Narrative:      Less than seven (7) mL's of blood was collected.  Insufficient quantity may yield false negative results.    Urine Culture - Urine, Urine, Clean Catch [[id number]]  (Normal) Collected: 05/30/24 1940    Lab Status: Preliminary result Specimen: Urine, Clean Catch Updated: 05/31/24 1057     Urine Culture Culture in progress            CT Head Without Contrast    Result Date: 5/31/2024  CT HEAD WO CONTRAST Date of Exam: 5/31/2024 7:22 PM EDT Indication: blurry vision, dizziness. Comparison: None available. Technique: Axial CT images were obtained of the head without contrast administration.  Automated exposure control and iterative construction methods were used. Findings: No large territory infarct. There is no evidence of hemorrhage. No mass effect, edema or midline shift Unremarkable white matter No extra-axial fluid collection. The ventricles are normal in  size and configuration. The visualized orbits are unremarkable. The visualized paranasal sinuses and mastoid air cells are clear. The visualized soft tissues are unremarkable. No acute osseous abnormality.     Impression: Impression: No acute intracranial abnormality. Electronically Signed: Jaiden Dustinfifi,   5/31/2024 7:58 PM EDT  Workstation ID: [id number]    XR Chest 1 View    Result Date: 5/30/2024  XR CHEST 1 VW Date of Exam: 5/30/2024 9:41 PM EDT Indication: dyspnea, productive cough Comparison: None available. Findings: Lines: None Lungs: Poor respiratory effort accentuates the pulmonary vasculature and cardiomediastinal silhouette. No definite consolidation. Pleura: No pleural effusion or pneumothorax. Cardiomediastinum: The cardiomediastinal silhouette is normal Soft Tissues: Unremarkable. Bones: No acute osseous abnormality.     Impression: Impression: No acute abnormality. Electronically Signed: Jaiden Sathya,   5/30/2024 10:19 PM EDT  Workstation ID: [id number]    CT Abdomen Pelvis With Contrast    Result Date: 5/30/2024  CT ABDOMEN PELVIS W CONTRAST Date of Exam: 5/30/2024 8:04 PM EDT Indication: alcohol abuse abd/ left flank pain. Comparison: None available. Technique: Axial CT images were obtained of the abdomen and pelvis following the uneventful intravenous administration of 85 cc Isovue-300 IV contrast . Reconstructed coronal and sagittal images were also obtained. Automated exposure control and iterative construction methods were used. FINDINGS: Lung bases: No masses. No consolidation. Liver: Extensive decreased hepatic attenuation suggesting hepatic steatosis. Spleen:No masses. No perisplenic hematoma. Pancreas:No pancreatic masses. No evidence of pancreatitis. Gallbladder and common bile duct: Prior cholecystectomy. Adrenal glands:No adrenal masses Kidneys and ureters:No kidney stones. No renal masses.No calculi present within the ureters. Normal caliber ureters. Urinary bladder: Urinary  bladder wall thickening and urothelial enhancement. Small bowel:Normal caliber small bowel. Large bowel:No diverticulosis or diverticulitis. No large bowel masses are appreciated Appendix: Not seen however there is no evidence of appendicitis. GENITOURINARY: Prior hysterectomy Ascites or pneumoperitoneum:None. Adenopathy:None present Osseous structures: Degenerative changes of the hips and lumbar spine. Other findings: None     Impression: Hepatic steatosis. Urothelial enhancement of the urinary bladder suggesting cystitis. Electronically Signed: Deric Martinez MD  5/30/2024 8:20 PM EDT  Workstation ID: [id number]         Current medications:  Scheduled Meds:cefTRIAXone, 1,000 mg, Intravenous, Q24H  enoxaparin, 40 mg, Subcutaneous, Daily  folic acid, 1 mg, Oral, Daily  LORazepam, 1 mg, Oral, Q6H   Followed by  [START ON 6/2/2024] LORazepam, 1 mg, Oral, Q12H   Followed by  [START ON 6/3/2024] LORazepam, 1 mg, Oral, Daily  multivitamin with minerals, 1 tablet, Oral, Daily  nicotine, 1 patch, Transdermal, Nightly  nystatin, , Topical, Q12H  senna-docusate sodium, 2 tablet, Oral, BID  sodium chloride, 10 mL, Intravenous, Q12H  thiamine (B-1) IV, 200 mg, Intravenous, Q8H   Followed by  [START ON 6/5/2024] thiamine, 100 mg, Oral, Daily      Continuous Infusions:   PRN Meds:.•  senna-docusate sodium **AND** polyethylene glycol **AND** bisacodyl **AND** bisacodyl  •  Calcium Replacement - Follow Nurse / BPA Driven Protocol  •  ipratropium-albuterol  •  LORazepam **OR** midazolam **OR** LORazepam **OR** midazolam **OR** midazolam **OR** midazolam  •  Magnesium Standard Dose Replacement - Follow Nurse / BPA Driven Protocol  •  nitroglycerin  •  ondansetron ODT **OR** ondansetron  •  Phosphorus Replacement - Follow Nurse / BPA Driven Protocol  •  Potassium Replacement - Follow Nurse / BPA Driven Protocol  •  sodium chloride  •  sodium chloride    Assessment & Plan   Assessment & Plan     Active Hospital Problems    Diagnosis  POA    • **Alcohol intoxication [F10.929]  Yes   • COPD (chronic obstructive pulmonary disease) [J44.9]  Unknown   • Elevated LFTs [R79.89]  Unknown   • Hypokalemia [E87.6]  Unknown      Resolved Hospital Problems   No resolved problems to display.        Brief Hospital Course to date:  Gertrudis Perera is a 53 y.o. female with history of COPD and alcohol abuse presents with left flank pain, dysuria, fever, chills, fatigue, poor appetite, nausea, vomiting, dyspnea, productive cough, edema, dizziness/lightheadedness and generalized weakness.      Alcohol withdrawal  Hepatic steatosis  Alcoholic hepatitis  - drinks a pint of bourbon daily  - CIWA  - scheduled and PRN Benzodiazepines  - IV thiamine  - CMP am    UTI  Cystitis  - rocephin, follow cultures    COPD  Tobacco abuse  - PRN nebs  - nicotine replacement    Productive cough  - no infiltrate on CXR, no leukocytosis  - broaden workup as clinically indicated    Blurry vision  Lightheadedness/dizziness  - visual fields full on exam  - CT head without acute changes    Hypokalemia  - replace electrolytes PRN    Thrombocytopenia  - likely secondary to alcohol abuse    Tachycardia  - likely secondary to withdrawal, EKG sinus    Hyperglycemia  - A1c 5.2    Generalized weakness  - PT/OT    With patient permission, called and updated daughter    Expected Discharge Location and Transportation:   Expected Discharge   Expected Discharge Date: 6/3/2024; Expected Discharge Time:      DVT prophylaxis:  Medical DVT prophylaxis orders are present.         AM-PAC 6 Clicks Score (PT): 13 (06/01/24 1452)    CODE STATUS:   Code Status and Medical Interventions:   Ordered at: 05/30/24 3135     Level Of Support Discussed With:    Patient     Code Status (Patient has no pulse and is not breathing):    CPR (Attempt to Resuscitate)     Medical Interventions (Patient has pulse or is breathing):    Full Support       Nathan Paige MD  06/01/24

## 2024-06-01 NOTE — PLAN OF CARE
Goal Outcome Evaluation:  Plan of Care Reviewed With: patient           Outcome Evaluation: OT eval completed. Pt presents with significant weakness, impaired balance, and poor activity tolerance impacting ADL's. Pt ModA for functional transfers, MaxA for toileting tasks, BELL for grooming while sitting up in bed. IP OT services warranted. Recommend IPR at discharge.      Anticipated Discharge Disposition (OT): inpatient rehabilitation facility

## 2024-06-02 LAB
ALBUMIN SERPL-MCNC: 2.9 G/DL (ref 3.5–5.2)
ALBUMIN/GLOB SERPL: 1 G/DL
ALP SERPL-CCNC: 206 U/L (ref 39–117)
ALT SERPL W P-5'-P-CCNC: 53 U/L (ref 1–33)
ANION GAP SERPL CALCULATED.3IONS-SCNC: 10 MMOL/L (ref 5–15)
AST SERPL-CCNC: 115 U/L (ref 1–32)
BACTERIA SPEC AEROBE CULT: ABNORMAL
BILIRUB SERPL-MCNC: 0.9 MG/DL (ref 0–1.2)
BUN SERPL-MCNC: 3 MG/DL (ref 6–20)
BUN/CREAT SERPL: 5.2 (ref 7–25)
CALCIUM SPEC-SCNC: 8.5 MG/DL (ref 8.6–10.5)
CHLORIDE SERPL-SCNC: 104 MMOL/L (ref 98–107)
CO2 SERPL-SCNC: 27 MMOL/L (ref 22–29)
CREAT SERPL-MCNC: 0.58 MG/DL (ref 0.57–1)
DEPRECATED RDW RBC AUTO: 55.4 FL (ref 37–54)
EGFRCR SERPLBLD CKD-EPI 2021: 109.7 ML/MIN/1.73
ERYTHROCYTE [DISTWIDTH] IN BLOOD BY AUTOMATED COUNT: 14.2 % (ref 12.3–15.4)
GLOBULIN UR ELPH-MCNC: 2.9 GM/DL
GLUCOSE SERPL-MCNC: 91 MG/DL (ref 65–99)
HCT VFR BLD AUTO: 38.9 % (ref 34–46.6)
HGB BLD-MCNC: 12.9 G/DL (ref 12–15.9)
MCH RBC QN AUTO: 34.9 PG (ref 26.6–33)
MCHC RBC AUTO-ENTMCNC: 33.2 G/DL (ref 31.5–35.7)
MCV RBC AUTO: 105.1 FL (ref 79–97)
PLATELET # BLD AUTO: 111 10*3/MM3 (ref 140–450)
PMV BLD AUTO: 11.3 FL (ref 6–12)
POTASSIUM SERPL-SCNC: 3.6 MMOL/L (ref 3.5–5.2)
POTASSIUM SERPL-SCNC: 4.6 MMOL/L (ref 3.5–5.2)
PROT SERPL-MCNC: 5.8 G/DL (ref 6–8.5)
RBC # BLD AUTO: 3.7 10*6/MM3 (ref 3.77–5.28)
SODIUM SERPL-SCNC: 141 MMOL/L (ref 136–145)
WBC NRBC COR # BLD AUTO: 5.19 10*3/MM3 (ref 3.4–10.8)

## 2024-06-02 PROCEDURE — 63710000001 ONDANSETRON ODT 4 MG TABLET DISPERSIBLE: Performed by: NURSE PRACTITIONER

## 2024-06-02 PROCEDURE — 25010000002 ENOXAPARIN PER 10 MG: Performed by: NURSE PRACTITIONER

## 2024-06-02 PROCEDURE — 84132 ASSAY OF SERUM POTASSIUM: CPT | Performed by: INTERNAL MEDICINE

## 2024-06-02 PROCEDURE — 85027 COMPLETE CBC AUTOMATED: CPT | Performed by: INTERNAL MEDICINE

## 2024-06-02 PROCEDURE — 25010000002 THIAMINE HCL 200 MG/2ML SOLUTION: Performed by: NURSE PRACTITIONER

## 2024-06-02 PROCEDURE — 99232 SBSQ HOSP IP/OBS MODERATE 35: CPT | Performed by: INTERNAL MEDICINE

## 2024-06-02 PROCEDURE — 80053 COMPREHEN METABOLIC PANEL: CPT | Performed by: INTERNAL MEDICINE

## 2024-06-02 PROCEDURE — 25010000002 CEFTRIAXONE PER 250 MG: Performed by: NURSE PRACTITIONER

## 2024-06-02 RX ORDER — BENZONATATE 100 MG/1
100 CAPSULE ORAL 3 TIMES DAILY PRN
Status: DISCONTINUED | OUTPATIENT
Start: 2024-06-02 | End: 2024-06-07 | Stop reason: HOSPADM

## 2024-06-02 RX ORDER — POTASSIUM CHLORIDE 20 MEQ/1
40 TABLET, EXTENDED RELEASE ORAL EVERY 4 HOURS
Status: COMPLETED | OUTPATIENT
Start: 2024-06-02 | End: 2024-06-02

## 2024-06-02 RX ORDER — HYDROXYZINE HYDROCHLORIDE 25 MG/1
25 TABLET, FILM COATED ORAL ONCE
Status: COMPLETED | OUTPATIENT
Start: 2024-06-03 | End: 2024-06-02

## 2024-06-02 RX ADMIN — LORAZEPAM 1 MG: 1 TABLET ORAL at 09:21

## 2024-06-02 RX ADMIN — THIAMINE HYDROCHLORIDE 200 MG: 100 INJECTION, SOLUTION INTRAMUSCULAR; INTRAVENOUS at 16:35

## 2024-06-02 RX ADMIN — NYSTATIN: 100000 POWDER TOPICAL at 20:13

## 2024-06-02 RX ADMIN — Medication 10 ML: at 20:13

## 2024-06-02 RX ADMIN — BENZONATATE 100 MG: 100 CAPSULE ORAL at 13:49

## 2024-06-02 RX ADMIN — THIAMINE HYDROCHLORIDE 200 MG: 100 INJECTION, SOLUTION INTRAMUSCULAR; INTRAVENOUS at 23:34

## 2024-06-02 RX ADMIN — FOLIC ACID 1 MG: 1 TABLET ORAL at 09:21

## 2024-06-02 RX ADMIN — SENNOSIDES AND DOCUSATE SODIUM 2 TABLET: 8.6; 5 TABLET ORAL at 20:12

## 2024-06-02 RX ADMIN — Medication 1 PATCH: at 20:15

## 2024-06-02 RX ADMIN — ONDANSETRON 4 MG: 4 TABLET, ORALLY DISINTEGRATING ORAL at 20:12

## 2024-06-02 RX ADMIN — SENNOSIDES AND DOCUSATE SODIUM 2 TABLET: 8.6; 5 TABLET ORAL at 09:21

## 2024-06-02 RX ADMIN — ENOXAPARIN SODIUM 40 MG: 100 INJECTION SUBCUTANEOUS at 09:20

## 2024-06-02 RX ADMIN — POTASSIUM CHLORIDE 40 MEQ: 1500 TABLET, EXTENDED RELEASE ORAL at 07:46

## 2024-06-02 RX ADMIN — THIAMINE HYDROCHLORIDE 200 MG: 100 INJECTION, SOLUTION INTRAMUSCULAR; INTRAVENOUS at 07:47

## 2024-06-02 RX ADMIN — SODIUM CHLORIDE 1000 MG: 900 INJECTION INTRAVENOUS at 20:12

## 2024-06-02 RX ADMIN — HYDROXYZINE HYDROCHLORIDE 25 MG: 25 TABLET, FILM COATED ORAL at 23:34

## 2024-06-02 RX ADMIN — Medication 1 TABLET: at 09:21

## 2024-06-02 RX ADMIN — Medication 10 ML: at 09:21

## 2024-06-02 RX ADMIN — NYSTATIN: 100000 POWDER TOPICAL at 09:21

## 2024-06-02 RX ADMIN — POTASSIUM CHLORIDE 40 MEQ: 1500 TABLET, EXTENDED RELEASE ORAL at 10:25

## 2024-06-02 NOTE — PLAN OF CARE
Goal Outcome Evaluation:  Plan of Care Reviewed With: patient     A&Ox4/ forgetful. VSS. ST on tele. Room air. CIWA 2-3. Denies HA. Denies N/V/D. Afebrile. No BM. Potassium replaced. No other concerns assessed or stated.

## 2024-06-02 NOTE — PROGRESS NOTES
[facility] Medicine Services  PROGRESS NOTE    Patient Name: Gertrudis Perera  : 1970  MRN: [id number]    Date of Admission: 2024  Primary Care Physician: Provider, No Known    Subjective   Subjective     CC:  Alcohol withdrawal    HPI:    No complaints this morning.  Denies tremors.  Says she doesn't want to drink alcohol again.      Objective   Objective     Vital Signs:   Temp:  [97.8 °F (36.6 °C)-98.6 °F (37 °C)] 98.1 °F (36.7 °C)  Heart Rate:  [] 99  Resp:  [18-19] 18  BP: (123-138)/(80-99) 123/80  Flow (L/min):  [2] 2     Physical Exam:    NAD, in bed  MM moist  RRR  CTAB  Abd soft, NT  Normal affect  Alert, speech clear  No tremors noted.    Results Reviewed:  LAB RESULTS:      Lab 2456 2447 24  194   WBC 5.19 5.29  --   --  5.27  --  5.47   HEMOGLOBIN 12.9 12.4  --   --  12.2  --  14.1   HEMATOCRIT 38.9 36.9  --   --  35.2  --  40.9   PLATELETS 111* 102*  --   --  111*  --  127*   NEUTROS ABS  --   --   --   --  3.14  --  2.85   IMMATURE GRANS (ABS)  --   --   --   --  0.04  --  0.03   LYMPHS ABS  --   --   --   --  1.59  --  2.00   MONOS ABS  --   --   --   --  0.42  --  0.47   EOS ABS  --   --   --   --  0.03  --  0.05   .1* 104.2*  --   --  103.5*  --  103.5*   LACTATE  --   --  1.6 2.1* 2.4* 2.7*  --    PROTIME  --   --   --   --  13.5  --   --          Lab 2456 24  1237 24  0933 24  0108 24   SODIUM 141 138  --   --   --  146* 146*   POTASSIUM 3.6 3.8 4.0  --  3.4* 3.2* 2.9*   CHLORIDE 104 100  --   --   --  107 101   CO2 27.0 26.0  --   --   --  26.0 31.0*   ANION GAP 10.0 12.0  --   --   --  13.0 14.0   BUN 3* 2*  --   --   --  6 7   CREATININE 0.58 0.59  --   --   --  0.61 0.73   EGFR 109.7 109.3  --   --   --  108.4 99.7   GLUCOSE 91 83  --   --   --  125* 121*   CALCIUM 8.5* 8.2*  --   --   --   7.7* 8.7   MAGNESIUM  --  1.9  --   --   --  1.5* 1.7   PHOSPHORUS  --  3.3 1.9* 1.5*  --  1.6*  --    HEMOGLOBIN A1C  --  5.20  --   --   --   --   --    TSH  --   --   --   --   --  1.160  --          Lab 06/02/24  0346 06/01/24  0656 05/30/24 1940   TOTAL PROTEIN 5.8* 5.7* 7.1   ALBUMIN 2.9* 2.9* 3.8   GLOBULIN 2.9 2.8 3.3   ALT (SGPT) 53* 63* 87*   AST (SGOT) 115* 177* 289*   BILIRUBIN 0.9 1.2 0.9   ALK PHOS 206* 203* 232*         Lab 05/31/24  0108   PROTIME 13.5   INR 1.02                 Brief Urine Lab Results  (Last result in the past 365 days)        Color   Clarity   Blood   Leuk Est   Nitrite   Protein   CREAT   Urine HCG        05/30/24 1940 Orange   Turbid   Trace   Moderate (2+)   Positive   100 mg/dL (2+)                   Microbiology Results Abnormal       Procedure Component Value - Date/Time    Blood Culture - Blood, Arm, Right [[id number]]  (Normal) Collected: 05/30/24 2030    Lab Status: Preliminary result Specimen: Blood from Arm, Right Updated: 06/01/24 2101     Blood Culture No growth at 2 days    Blood Culture - Blood, Hand, Right [[id number]]  (Normal) Collected: 05/30/24 2050    Lab Status: Preliminary result Specimen: Blood from Hand, Right Updated: 06/01/24 2101     Blood Culture No growth at 2 days    Narrative:      Less than seven (7) mL's of blood was collected.  Insufficient quantity may yield false negative results.            CT Head Without Contrast    Result Date: 5/31/2024  CT HEAD WO CONTRAST Date of Exam: 5/31/2024 7:22 PM EDT Indication: blurry vision, dizziness. Comparison: None available. Technique: Axial CT images were obtained of the head without contrast administration.  Automated exposure control and iterative construction methods were used. Findings: No large territory infarct. There is no evidence of hemorrhage. No mass effect, edema or midline shift Unremarkable white matter No extra-axial fluid collection. The ventricles are normal in size and configuration. The  visualized orbits are unremarkable. The visualized paranasal sinuses and mastoid air cells are clear. The visualized soft tissues are unremarkable. No acute osseous abnormality.     Impression: Impression: No acute intracranial abnormality. Electronically Signed: Jaiden Mcdonnell,   5/31/2024 7:58 PM EDT  Workstation ID: [id number]         Current medications:  Scheduled Meds:cefTRIAXone, 1,000 mg, Intravenous, Q24H  enoxaparin, 40 mg, Subcutaneous, Daily  folic acid, 1 mg, Oral, Daily  [START ON 6/3/2024] LORazepam, 1 mg, Oral, Daily  multivitamin with minerals, 1 tablet, Oral, Daily  nicotine, 1 patch, Transdermal, Nightly  nystatin, , Topical, Q12H  senna-docusate sodium, 2 tablet, Oral, BID  sodium chloride, 10 mL, Intravenous, Q12H  thiamine (B-1) IV, 200 mg, Intravenous, Q8H   Followed by  [START ON 6/5/2024] thiamine, 100 mg, Oral, Daily      Continuous Infusions:   PRN Meds:.•  senna-docusate sodium **AND** polyethylene glycol **AND** bisacodyl **AND** bisacodyl  •  Calcium Replacement - Follow Nurse / BPA Driven Protocol  •  ipratropium-albuterol  •  LORazepam **OR** midazolam **OR** LORazepam **OR** midazolam **OR** midazolam **OR** midazolam  •  Magnesium Standard Dose Replacement - Follow Nurse / BPA Driven Protocol  •  nitroglycerin  •  ondansetron ODT **OR** ondansetron  •  Phosphorus Replacement - Follow Nurse / BPA Driven Protocol  •  Potassium Replacement - Follow Nurse / BPA Driven Protocol  •  sodium chloride  •  sodium chloride    Assessment & Plan   Assessment & Plan     Active Hospital Problems    Diagnosis  POA   • **Alcohol intoxication [F10.929]  Yes   • COPD (chronic obstructive pulmonary disease) [J44.9]  Unknown   • Elevated LFTs [R79.89]  Unknown   • Hypokalemia [E87.6]  Unknown      Resolved Hospital Problems   No resolved problems to display.        Brief Hospital Course to date:  Gertrudis Perera is a 53 y.o. female with history of COPD and alcohol abuse presents with left flank pain,  dysuria, fever, chills, fatigue, poor appetite, nausea, vomiting, dyspnea, productive cough, edema, dizziness/lightheadedness and generalized weakness.      Alcohol withdrawal  Hepatic steatosis  Alcoholic hepatitis  - drinks a pint of bourbon daily  - CIWA  - tapering scheduled ativan, continue PRN Benzodiazepines  - IV thiamine  - CMP am    UTI, E coli  Cystitis  - rocephin    COPD  Tobacco abuse  - PRN nebs  - nicotine replacement    Productive cough  - no infiltrate on CXR, no leukocytosis  - broaden workup as clinically indicated    Blurry vision  Lightheadedness/dizziness  - visual fields full on exam  - CT head without acute changes    Hypokalemia  - replace electrolytes PRN    Thrombocytopenia  - likely secondary to alcohol abuse    Tachycardia  - likely secondary to withdrawal, EKG sinus    Hyperglycemia  - A1c 5.2    Generalized weakness  - PT/OT    With patient permission, called and updated daughter    Expected Discharge Location and Transportation:   Expected Discharge   Expected Discharge Date: 6/3/2024; Expected Discharge Time:      DVT prophylaxis:  Medical DVT prophylaxis orders are present.         AM-PAC 6 Clicks Score (PT): 14 (06/02/24 3089)    CODE STATUS:   Code Status and Medical Interventions:   Ordered at: 05/30/24 1716     Level Of Support Discussed With:    Patient     Code Status (Patient has no pulse and is not breathing):    CPR (Attempt to Resuscitate)     Medical Interventions (Patient has pulse or is breathing):    Full Support       Nathan Paige MD  06/02/24

## 2024-06-03 LAB
ALBUMIN SERPL-MCNC: 3 G/DL (ref 3.5–5.2)
ALBUMIN/GLOB SERPL: 0.9 G/DL
ALP SERPL-CCNC: 197 U/L (ref 39–117)
ALT SERPL W P-5'-P-CCNC: 47 U/L (ref 1–33)
AMMONIA BLD-SCNC: 67 UMOL/L (ref 11–51)
ANION GAP SERPL CALCULATED.3IONS-SCNC: 10 MMOL/L (ref 5–15)
AST SERPL-CCNC: 90 U/L (ref 1–32)
BILIRUB SERPL-MCNC: 0.8 MG/DL (ref 0–1.2)
BUN SERPL-MCNC: 5 MG/DL (ref 6–20)
BUN/CREAT SERPL: 9.6 (ref 7–25)
CALCIUM SPEC-SCNC: 8.9 MG/DL (ref 8.6–10.5)
CHLORIDE SERPL-SCNC: 103 MMOL/L (ref 98–107)
CO2 SERPL-SCNC: 24 MMOL/L (ref 22–29)
CREAT SERPL-MCNC: 0.52 MG/DL (ref 0.57–1)
EGFRCR SERPLBLD CKD-EPI 2021: 112.6 ML/MIN/1.73
GLOBULIN UR ELPH-MCNC: 3.2 GM/DL
GLUCOSE SERPL-MCNC: 98 MG/DL (ref 65–99)
POTASSIUM SERPL-SCNC: 4.1 MMOL/L (ref 3.5–5.2)
PROT SERPL-MCNC: 6.2 G/DL (ref 6–8.5)
SODIUM SERPL-SCNC: 137 MMOL/L (ref 136–145)

## 2024-06-03 PROCEDURE — 25010000002 THIAMINE HCL 200 MG/2ML SOLUTION: Performed by: NURSE PRACTITIONER

## 2024-06-03 PROCEDURE — 82140 ASSAY OF AMMONIA: CPT | Performed by: INTERNAL MEDICINE

## 2024-06-03 PROCEDURE — 99232 SBSQ HOSP IP/OBS MODERATE 35: CPT | Performed by: NURSE PRACTITIONER

## 2024-06-03 PROCEDURE — 25010000002 CEFTRIAXONE PER 250 MG: Performed by: NURSE PRACTITIONER

## 2024-06-03 PROCEDURE — 25010000002 ENOXAPARIN PER 10 MG: Performed by: NURSE PRACTITIONER

## 2024-06-03 PROCEDURE — 80053 COMPREHEN METABOLIC PANEL: CPT | Performed by: INTERNAL MEDICINE

## 2024-06-03 RX ORDER — HYDROXYZINE HYDROCHLORIDE 25 MG/1
25 TABLET, FILM COATED ORAL 3 TIMES DAILY PRN
Status: DISCONTINUED | OUTPATIENT
Start: 2024-06-03 | End: 2024-06-07 | Stop reason: HOSPADM

## 2024-06-03 RX ORDER — LORAZEPAM 1 MG/1
1 TABLET ORAL EVERY 6 HOURS PRN
Status: ACTIVE | OUTPATIENT
Start: 2024-06-03 | End: 2024-06-04

## 2024-06-03 RX ORDER — GUAIFENESIN 200 MG/10ML
200 LIQUID ORAL EVERY 4 HOURS PRN
Status: DISCONTINUED | OUTPATIENT
Start: 2024-06-03 | End: 2024-06-07 | Stop reason: HOSPADM

## 2024-06-03 RX ADMIN — LORAZEPAM 1 MG: 1 TABLET ORAL at 08:30

## 2024-06-03 RX ADMIN — BENZONATATE 100 MG: 100 CAPSULE ORAL at 14:29

## 2024-06-03 RX ADMIN — NYSTATIN: 100000 POWDER TOPICAL at 20:33

## 2024-06-03 RX ADMIN — NYSTATIN: 100000 POWDER TOPICAL at 08:31

## 2024-06-03 RX ADMIN — SODIUM CHLORIDE 1000 MG: 900 INJECTION INTRAVENOUS at 20:33

## 2024-06-03 RX ADMIN — FOLIC ACID 1 MG: 1 TABLET ORAL at 08:31

## 2024-06-03 RX ADMIN — Medication 10 ML: at 08:31

## 2024-06-03 RX ADMIN — THIAMINE HYDROCHLORIDE 200 MG: 100 INJECTION, SOLUTION INTRAMUSCULAR; INTRAVENOUS at 23:54

## 2024-06-03 RX ADMIN — THIAMINE HYDROCHLORIDE 200 MG: 100 INJECTION, SOLUTION INTRAMUSCULAR; INTRAVENOUS at 07:36

## 2024-06-03 RX ADMIN — GUAIFENESIN 200 MG: 200 SOLUTION ORAL at 20:52

## 2024-06-03 RX ADMIN — Medication 10 ML: at 20:33

## 2024-06-03 RX ADMIN — Medication 1 TABLET: at 08:30

## 2024-06-03 RX ADMIN — SENNOSIDES AND DOCUSATE SODIUM 2 TABLET: 8.6; 5 TABLET ORAL at 08:31

## 2024-06-03 RX ADMIN — Medication 1 PATCH: at 20:32

## 2024-06-03 RX ADMIN — ENOXAPARIN SODIUM 40 MG: 100 INJECTION SUBCUTANEOUS at 08:30

## 2024-06-03 RX ADMIN — THIAMINE HYDROCHLORIDE 200 MG: 100 INJECTION, SOLUTION INTRAMUSCULAR; INTRAVENOUS at 15:32

## 2024-06-03 RX ADMIN — SENNOSIDES AND DOCUSATE SODIUM 2 TABLET: 8.6; 5 TABLET ORAL at 20:33

## 2024-06-03 RX ADMIN — HYDROXYZINE HYDROCHLORIDE 25 MG: 25 TABLET ORAL at 20:33

## 2024-06-03 NOTE — CASE MANAGEMENT/SOCIAL WORK
Continued Stay Note  [facility]     Patient Name: Gertrudis Perera  MRN: [id number]  Today's Date: 6/3/2024    Admit Date: 5/30/2024    Plan: Ongoing   Discharge Plan       Row Name 06/03/24 5741       Plan    Plan Ongoing    Plan Comments Spoke with patient and daughter at bedside. Patient stated that she is interested in going to inpatient AUD treatment; however, she must first be physically able and have secured her Medicaid.    Patient's daughter stated they have reviewed the comprehensive treatment packet provided by our team and her mom is interested in [facility], specifically, [facility].    Our team will continue to follow and offer any recovery support/services needed.      Row Name 06/03/24 6738       Plan    Plan Comments Met with patient at the bedside to discuss discharge plan. Therapy recommends inpatient rehab at discharge, but patient's Medicaid is still pending. Without insurance, patient will be unable to go to rehab at discharge. Patient is considering AUD treatment at discharge. CM will continue to follow.    Final Discharge Disposition Code 01 - home or self-care                   Discharge Codes    No documentation.                 Expected Discharge Date and Time       Expected Discharge Date Expected Discharge Time    Jun 4, 2024               Jordana Coy   Addiction Treatment Navigator  Ext. [phone]

## 2024-06-03 NOTE — PROGRESS NOTES
"    [facility] Medicine Services  PROGRESS NOTE    Patient Name: Gertrudis Perera  : 1970  MRN: [id number]    Date of Admission: 2024  Primary Care Physician: Provider, No Known    Subjective   Subjective     CC:  Alcohol withdrawal    HPI:  Patient is sitting up in bed and states she feels awful but cannot give any definitive complaint.  Appears very depressed.  States she will never drink again after \"what happened to me\".  Daughter in the room. PT/OT recommended inpatient rehab.    Objective   Objective     Vital Signs:   Temp:  [97.1 °F (36.2 °C)-98.5 °F (36.9 °C)] 97.7 °F (36.5 °C)  Heart Rate:  [100-138] 100  Resp:  [16-18] 16  BP: (121-134)/(78-95) 126/80     Physical Exam:  Constitutional: Alert, older female sitting up in bed in NAD  ENT: Pink, moist mucous membranes   Respiratory: Nonlabored, symmetrical chest expansion, CTAB, 96% RA  Cardiovascular:  mildly tachycardic, no M/R/G.  Gastrointestinal: Soft, NT, ND +BS  Musculoskeletal: CEJA; no LE edema bilaterally  Neurologic: Oriented x4, strength symmetric in all extremities, follows all commands, CN II-XII intact, speech clear  Skin: No rashes on exposed skin, pale  Psychiatric: Pleasant and cooperative; depressed affect    Results Reviewed:  LAB RESULTS:      Lab 24  0346 24  0656 24  0647 24  0411 24  0108 24  1940   WBC 5.19 5.29  --   --  5.27  --  5.47   HEMOGLOBIN 12.9 12.4  --   --  12.2  --  14.1   HEMATOCRIT 38.9 36.9  --   --  35.2  --  40.9   PLATELETS 111* 102*  --   --  111*  --  127*   NEUTROS ABS  --   --   --   --  3.14  --  2.85   IMMATURE GRANS (ABS)  --   --   --   --  0.04  --  0.03   LYMPHS ABS  --   --   --   --  1.59  --  2.00   MONOS ABS  --   --   --   --  0.42  --  0.47   EOS ABS  --   --   --   --  0.03  --  0.05   .1* 104.2*  --   --  103.5*  --  103.5*   LACTATE  --   --  1.6 2.1* 2.4* 2.7*  --    PROTIME  --   --   --   --  13.5  --   " --          Lab 06/03/24  0449 06/02/24  1601 06/02/24  0346 06/01/24  0656 05/31/24 2029 05/31/24  1237 05/31/24  0933 05/31/24  0108 05/30/24 1940   SODIUM 137  --  141 138  --   --   --  146* 146*   POTASSIUM 4.1 4.6 3.6 3.8 4.0  --    < > 3.2* 2.9*   CHLORIDE 103  --  104 100  --   --   --  107 101   CO2 24.0  --  27.0 26.0  --   --   --  26.0 31.0*   ANION GAP 10.0  --  10.0 12.0  --   --   --  13.0 14.0   BUN 5*  --  3* 2*  --   --   --  6 7   CREATININE 0.52*  --  0.58 0.59  --   --   --  0.61 0.73   EGFR 112.6  --  109.7 109.3  --   --   --  108.4 99.7   GLUCOSE 98  --  91 83  --   --   --  125* 121*   CALCIUM 8.9  --  8.5* 8.2*  --   --   --  7.7* 8.7   MAGNESIUM  --   --   --  1.9  --   --   --  1.5* 1.7   PHOSPHORUS  --   --   --  3.3 1.9* 1.5*  --  1.6*  --    HEMOGLOBIN A1C  --   --   --  5.20  --   --   --   --   --    TSH  --   --   --   --   --   --   --  1.160  --     < > = values in this interval not displayed.         Lab 06/03/24  0449 06/02/24  0346 06/01/24  0656 05/30/24 1940   TOTAL PROTEIN 6.2 5.8* 5.7* 7.1   ALBUMIN 3.0* 2.9* 2.9* 3.8   GLOBULIN 3.2 2.9 2.8 3.3   ALT (SGPT) 47* 53* 63* 87*   AST (SGOT) 90* 115* 177* 289*   BILIRUBIN 0.8 0.9 1.2 0.9   ALK PHOS 197* 206* 203* 232*         Lab 05/31/24  0108   PROTIME 13.5   INR 1.02                 Brief Urine Lab Results  (Last result in the past 365 days)        Color   Clarity   Blood   Leuk Est   Nitrite   Protein   CREAT   Urine HCG        05/30/24 1940 Orange   Turbid   Trace   Moderate (2+)   Positive   100 mg/dL (2+)                   Microbiology Results Abnormal       Procedure Component Value - Date/Time    Blood Culture - Blood, Arm, Right [[id number]]  (Normal) Collected: 05/30/24 2030    Lab Status: Preliminary result Specimen: Blood from Arm, Right Updated: 06/02/24 2101     Blood Culture No growth at 3 days    Blood Culture - Blood, Hand, Right [[id number]]  (Normal) Collected: 05/30/24 2050    Lab Status: Preliminary result  Specimen: Blood from Hand, Right Updated: 06/02/24 2101     Blood Culture No growth at 3 days    Narrative:      Less than seven (7) mL's of blood was collected.  Insufficient quantity may yield false negative results.            No radiology results from the last 24 hrs        Current medications:  Scheduled Meds:cefTRIAXone, 1,000 mg, Intravenous, Q24H  enoxaparin, 40 mg, Subcutaneous, Daily  folic acid, 1 mg, Oral, Daily  multivitamin with minerals, 1 tablet, Oral, Daily  nicotine, 1 patch, Transdermal, Nightly  nystatin, , Topical, Q12H  senna-docusate sodium, 2 tablet, Oral, BID  sodium chloride, 10 mL, Intravenous, Q12H  thiamine (B-1) IV, 200 mg, Intravenous, Q8H   Followed by  [START ON 6/5/2024] thiamine, 100 mg, Oral, Daily      Continuous Infusions:   PRN Meds:.•  benzonatate  •  senna-docusate sodium **AND** polyethylene glycol **AND** bisacodyl **AND** bisacodyl  •  Calcium Replacement - Follow Nurse / BPA Driven Protocol  •  guaifenesin  •  hydrOXYzine  •  ipratropium-albuterol  •  LORazepam  •  Magnesium Standard Dose Replacement - Follow Nurse / BPA Driven Protocol  •  nitroglycerin  •  ondansetron ODT **OR** ondansetron  •  Phosphorus Replacement - Follow Nurse / BPA Driven Protocol  •  Potassium Replacement - Follow Nurse / BPA Driven Protocol  •  sodium chloride  •  sodium chloride    Assessment & Plan   Assessment & Plan     Active Hospital Problems    Diagnosis  POA   • **Alcohol intoxication [F10.929]  Yes   • COPD (chronic obstructive pulmonary disease) [J44.9]  Unknown   • Elevated LFTs [R79.89]  Unknown   • Hypokalemia [E87.6]  Unknown      Resolved Hospital Problems   No resolved problems to display.        Brief Hospital Course to date:  Gertrudis Perera is a 53 y.o. female with history of COPD and alcohol abuse presents with left flank pain, dysuria, fever, chills, fatigue, poor appetite, nausea, vomiting, dyspnea, productive cough, edema, dizziness/lightheadedness and generalized weakness.       These problems are new to me today    This patient's problems and plans were partially entered by my partner and updated as appropriate by me 06/03/24.    Alcohol withdrawal  Hepatic steatosis  Alcoholic hepatitis  - drinks a pint of bourbon daily  - CIWA   --continue PRN Benzodiazepines but tapering  - IV thiamine changed to PO  - H3O2 67  -- Daughter and patient considering inpatient alcohol rehab  -- Addiction medicine consult  --AM labs    UTI, E coli  Cystitis  - rocephin    COPD  Tobacco abuse  - PRN nebs  - nicotine replacement    Productive cough  - no infiltrate on CXR, no leukocytosis  - PRN Tessalon and Robitussin    Blurry vision  Lightheadedness/dizziness  - visual fields full on exam  - CT head without acute changes  --PT/OT recommended inpatient rehab    Hypokalemia  - replace electrolytes PRN    Thrombocytopenia  --Plt 111  --likely secondary to alcohol abuse    Tachycardia  - likely secondary to withdrawal, EKG sinus    Hyperglycemia  - A1c 5.2  --Most likely d/t alcohol abuse    Generalized weakness  - PT/OT    Expected Discharge Location and Transportation:   Expected Discharge   Expected Discharge Date: 6/4/2024; Expected Discharge Time:      DVT prophylaxis:  Medical DVT prophylaxis orders are present.         AM-PAC 6 Clicks Score (PT): 18 (06/03/24 8916)    CODE STATUS:   Code Status and Medical Interventions:   Ordered at: 05/30/24 9603     Level Of Support Discussed With:    Patient     Code Status (Patient has no pulse and is not breathing):    CPR (Attempt to Resuscitate)     Medical Interventions (Patient has pulse or is breathing):    Full Support       BEBA Samaniego  06/03/24

## 2024-06-03 NOTE — PLAN OF CARE
Goal Outcome Evaluation:  Plan of Care Reviewed With: patient    A&Ox4/ forgetful. VSS. ST on tele. Room air. CIWA 2-3. Denies HA. Denies N/V/D. Afebrile. No BM. No other concerns assessed or stated.

## 2024-06-04 LAB
AMMONIA BLD-SCNC: 34 UMOL/L (ref 11–51)
BACTERIA SPEC AEROBE CULT: NORMAL
BACTERIA SPEC AEROBE CULT: NORMAL
DEPRECATED RDW RBC AUTO: 57 FL (ref 37–54)
ERYTHROCYTE [DISTWIDTH] IN BLOOD BY AUTOMATED COUNT: 14.7 % (ref 12.3–15.4)
HCT VFR BLD AUTO: 40.1 % (ref 34–46.6)
HGB BLD-MCNC: 13.6 G/DL (ref 12–15.9)
MAGNESIUM SERPL-MCNC: 1.8 MG/DL (ref 1.6–2.6)
MCH RBC QN AUTO: 35.7 PG (ref 26.6–33)
MCHC RBC AUTO-ENTMCNC: 33.9 G/DL (ref 31.5–35.7)
MCV RBC AUTO: 105.2 FL (ref 79–97)
PHOSPHATE SERPL-MCNC: 3.9 MG/DL (ref 2.5–4.5)
PLATELET # BLD AUTO: 166 10*3/MM3 (ref 140–450)
PMV BLD AUTO: 10.8 FL (ref 6–12)
RBC # BLD AUTO: 3.81 10*6/MM3 (ref 3.77–5.28)
WBC NRBC COR # BLD AUTO: 6.91 10*3/MM3 (ref 3.4–10.8)

## 2024-06-04 PROCEDURE — 84100 ASSAY OF PHOSPHORUS: CPT | Performed by: NURSE PRACTITIONER

## 2024-06-04 PROCEDURE — 85027 COMPLETE CBC AUTOMATED: CPT | Performed by: NURSE PRACTITIONER

## 2024-06-04 PROCEDURE — 83735 ASSAY OF MAGNESIUM: CPT | Performed by: NURSE PRACTITIONER

## 2024-06-04 PROCEDURE — 99232 SBSQ HOSP IP/OBS MODERATE 35: CPT | Performed by: PEDIATRICS

## 2024-06-04 PROCEDURE — 82140 ASSAY OF AMMONIA: CPT | Performed by: NURSE PRACTITIONER

## 2024-06-04 PROCEDURE — 25010000002 THIAMINE HCL 200 MG/2ML SOLUTION: Performed by: NURSE PRACTITIONER

## 2024-06-04 PROCEDURE — 25010000002 ENOXAPARIN PER 10 MG: Performed by: NURSE PRACTITIONER

## 2024-06-04 RX ORDER — NICOTINE 21 MG/24HR
1 PATCH, TRANSDERMAL 24 HOURS TRANSDERMAL EVERY 24 HOURS
Status: DISCONTINUED | OUTPATIENT
Start: 2024-06-04 | End: 2024-06-07 | Stop reason: HOSPADM

## 2024-06-04 RX ORDER — UREA 10 %
5 LOTION (ML) TOPICAL NIGHTLY PRN
Status: DISCONTINUED | OUTPATIENT
Start: 2024-06-04 | End: 2024-06-07 | Stop reason: HOSPADM

## 2024-06-04 RX ORDER — TEMAZEPAM 15 MG/1
15 CAPSULE ORAL ONCE
Status: COMPLETED | OUTPATIENT
Start: 2024-06-04 | End: 2024-06-04

## 2024-06-04 RX ADMIN — HYDROXYZINE HYDROCHLORIDE 25 MG: 25 TABLET ORAL at 04:05

## 2024-06-04 RX ADMIN — THIAMINE HYDROCHLORIDE 200 MG: 100 INJECTION, SOLUTION INTRAMUSCULAR; INTRAVENOUS at 15:12

## 2024-06-04 RX ADMIN — NYSTATIN: 100000 POWDER TOPICAL at 08:35

## 2024-06-04 RX ADMIN — SENNOSIDES AND DOCUSATE SODIUM 2 TABLET: 8.6; 5 TABLET ORAL at 20:07

## 2024-06-04 RX ADMIN — Medication 5 MG: at 20:51

## 2024-06-04 RX ADMIN — Medication 10 ML: at 08:35

## 2024-06-04 RX ADMIN — GUAIFENESIN 200 MG: 200 SOLUTION ORAL at 20:06

## 2024-06-04 RX ADMIN — HYDROXYZINE HYDROCHLORIDE 25 MG: 25 TABLET ORAL at 14:22

## 2024-06-04 RX ADMIN — GUAIFENESIN 200 MG: 200 SOLUTION ORAL at 04:05

## 2024-06-04 RX ADMIN — NYSTATIN: 100000 POWDER TOPICAL at 20:51

## 2024-06-04 RX ADMIN — SENNOSIDES AND DOCUSATE SODIUM 2 TABLET: 8.6; 5 TABLET ORAL at 08:34

## 2024-06-04 RX ADMIN — TEMAZEPAM 15 MG: 15 CAPSULE ORAL at 22:39

## 2024-06-04 RX ADMIN — THIAMINE HYDROCHLORIDE 200 MG: 100 INJECTION, SOLUTION INTRAMUSCULAR; INTRAVENOUS at 07:38

## 2024-06-04 RX ADMIN — Medication 1 TABLET: at 08:34

## 2024-06-04 RX ADMIN — Medication 1 PATCH: at 14:22

## 2024-06-04 RX ADMIN — FOLIC ACID 1 MG: 1 TABLET ORAL at 08:34

## 2024-06-04 RX ADMIN — ENOXAPARIN SODIUM 40 MG: 100 INJECTION SUBCUTANEOUS at 08:35

## 2024-06-04 RX ADMIN — Medication 10 ML: at 20:51

## 2024-06-04 NOTE — CASE MANAGEMENT/SOCIAL WORK
Continued Stay Note  [facility]     Patient Name: Gertrudis Perera  MRN: [id number]  Today's Date: 6/4/2024    Admit Date: 5/30/2024    Plan: Acute Rehab   Discharge Plan       Row Name 06/04/24 1305       Plan    Plan Acute Rehab    Patient/Family in Agreement with Plan yes    Plan Comments  spoke with Fátima/ Fern. She states that pt has been approved for [facility] Medicaid from 5/30/24-6/30/24. ID # [id number]. She states that [employer] is working on the full Medicaid application to submit. CM spoke with pt's daughter, Corinne, on the phone and Ms. Perera, at the bedside. PT/OT are recommending Inpatient Rehab. We discussed Acute rehab, DOROTHEA explained this verses sub acute rehab. Pt lives in [place], [place]. We reviewed acute facilities within 75 miles of [place]. Corinne states that [facility], in [place], is the closest facility to them. Corinne states she would like to be able to visit her mother at rehab. Pt is agreeable with facility.  spoke with Trent/velia at [facility]. He confirmed they are an Acute rehab facility. He states that the CM needs to call Amna/trent, to make referral, at [phone]. CM spoke with Amna, on the phone. She states that she has access to [employer] and needs CM to fax facesheet, H&P, and PT/OT notes to [phone]. DOROTHEA let Amna know that the D/C plan is for Acute rehab, then to ETOH rehab, then home. CM faxed information.  will continue to follow.    Final Discharge Disposition Code 62 - inpatient rehab facility                   Discharge Codes    No documentation.                 Expected Discharge Date and Time       Expected Discharge Date Expected Discharge Time    Jun 4, 2024               Amarilis Andre RN

## 2024-06-04 NOTE — DISCHARGE PLACEMENT REQUEST
" [phone]    [facility] Medicaid ID [id number], good from -24    Isabella Salas (53 y.o. Female)       Date of Birth   1970    Social Security Number       Address   [address] [facility] [address]    Home Phone   [phone]    MRN   [id number]       Adventism   Unknown    Marital Status   Single                            Admission Date   24    Admission Type   Emergency    Admitting Provider   Leonie Walls MD    Attending Provider   Leonie Walls MD    Department, Room/Bed   [facility] 5H, S586/1       Discharge Date       Discharge Disposition       Discharge Destination                                 Attending Provider: Leonie Walls MD    Allergies: Rifampin    Isolation: None   Infection: None   Code Status: CPR    Ht: 157.5 cm (62\")   Wt: 72.6 kg (160 lb)    Admission Cmt: None   Principal Problem: Alcohol intoxication [F10.929]                   Active Insurance as of 2024       Primary Coverage       Payor Plan Insurance Group Employer/Plan Group    MEDICAID PENDING MEDICAID PENDING        Payor Plan Address Payor Plan Phone Number Payor Plan Fax Number Effective Dates       2024 - 2024      Subscriber Name Subscriber Birth Date Member ID       ISABELLA SALAS 1970                      Emergency Contacts        (Rel.) Home Phone Work Phone Mobile Phone    JAQUAN THOMAS (Daughter) [phone] -- --                 History & Physical        Julio César Hughes III, DO at 24              [facility]  HISTORY AND PHYSICAL    Patient Name: Isabella Salas  : 1970  MRN: [id number]  Primary Care Physician: Provider, No Known  Date of admission: 2024    Subjective  Subjective     Chief Complaint:  Left sided pain    HPI:  Isabella Salas is a 53 y.o. female with a history of COPD, alcohol abuse, presents to the ED with complaints of " left flank pain that started yesterday.  Patient reports that today she has been experiencing sharp stabbing left-sided pain and pain in both legs.  She also endorses fever, chills, fatigue, loss of appetite, shortness of air, productive cough with green sputum, wheezing, edema, constipation, dysuria, foul odor to urine, dizziness, lightheadedness, generalized weakness.  Patient states that she drinks a pint of bourbon daily for the past 10 years.  She states that when she starts to withdrawal she starts drinking to help relieve symptoms.  This morning she was experiencing tremors to her upper extremities, nausea, and vomiting which prompted her to drink.  Her last drink was this morning.  She also states that she used THC today to help with symptoms.  No chest pain, diarrhea, difficulty urinating, headaches, syncope, or any other complaints at this time.  CT abdomen pelvis shows hepatic steatosis.  Urothelial enhancement of the urinary bladder suggesting cystitis.  Patient was given 2 L of saline and started on Rocephin in the ED.  Patient is being admitted to the hospitalist for further evaluation management.        Review of Systems   Constitutional:  Positive for appetite change, chills, fatigue and fever.   HENT:  Positive for congestion and sore throat.    Eyes:  Positive for discharge.   Respiratory:  Positive for cough, shortness of breath and wheezing.    Cardiovascular:  Positive for leg swelling.   Gastrointestinal:  Positive for abdominal pain, constipation, nausea and vomiting.   Endocrine: Negative.    Genitourinary:  Positive for dysuria and flank pain. Negative for difficulty urinating, frequency and urgency.   Musculoskeletal:  Positive for back pain. Negative for neck pain and neck stiffness.   Skin: Negative.    Allergic/Immunologic: Negative.    Neurological:  Positive for dizziness, tremors, weakness and light-headedness. Negative for seizures, syncope and headaches.   Hematological: Negative.     Psychiatric/Behavioral: Negative.                  Personal History     Past Medical History:   Diagnosis Date    COPD (chronic obstructive pulmonary disease)              Past Surgical History:   Procedure Laterality Date    CHOLECYSTECTOMY      HYSTERECTOMY         Family History:  family history is not on file.     Social History:    Social History     Social History Narrative    Not on file       Medications:       Allergies   Allergen Reactions    Rifampin Nausea Only       Objective  Objective     Vital Signs:   Temp:  [99.1 °F (37.3 °C)] 99.1 °F (37.3 °C)  Heart Rate:  [101-149] 111  Resp:  [25] 25  BP: (115-145)/(73-98) 145/90    Physical Exam   Constitutional: Awake, alert, resting in bed, daughter at bedside  Eyes: PERRLA, sclerae anicteric, no conjunctival injection  HENT: NCAT, mucous membranes moist  Neck: Supple, no thyromegaly, no lymphadenopathy, trachea midline  Respiratory: course bilaterally diminished in the bases, nonlabored respirations, non productive cough during exam  Cardiovascular: tachycardia, no murmurs, rubs, or gallops, palpable pedal pulses bilaterally  Gastrointestinal: Positive bowel sounds, soft, nontender, nondistended  Musculoskeletal: No bilateral ankle edema, no clubbing or cyanosis to extremities  Psychiatric: Appropriate affect, cooperative  Neurologic: Oriented x 3, strength symmetric in all extremities, Cranial Nerves grossly intact to confrontation, speech clear, tremors to BUE  Skin: No rashes       Result Review:  I have personally reviewed the results from the time of this admission to 5/30/2024 22:23 EDT and agree with these findings:  [x]  Laboratory list / accordion  []  Microbiology  [x]  Radiology  []  EKG/Telemetry   []  Cardiology/Vascular   []  Pathology  [x]  Old records  []  Other:  Most notable findings include:     LAB RESULTS:      Lab 05/30/24 2036 05/30/24 1940   WBC  --  5.47   HEMOGLOBIN  --  14.1   HEMATOCRIT  --  40.9   PLATELETS  --  127*    NEUTROS ABS  --  2.85   IMMATURE GRANS (ABS)  --  0.03   LYMPHS ABS  --  2.00   MONOS ABS  --  0.47   EOS ABS  --  0.05   MCV  --  103.5*   LACTATE 2.7*  --          Lab 05/30/24 1940   SODIUM 146*   POTASSIUM 2.9*   CHLORIDE 101   CO2 31.0*   ANION GAP 14.0   BUN 7   CREATININE 0.73   EGFR 99.7   GLUCOSE 121*   CALCIUM 8.7   MAGNESIUM 1.7         Lab 05/30/24 1940   TOTAL PROTEIN 7.1   ALBUMIN 3.8   GLOBULIN 3.3   ALT (SGPT) 87*   AST (SGOT) 289*   BILIRUBIN 0.9   ALK PHOS 232*                     Brief Urine Lab Results  (Last result in the past 365 days)        Color   Clarity   Blood   Leuk Est   Nitrite   Protein   CREAT   Urine HCG        05/30/24 1940 Orange   Turbid   Trace   Moderate (2+)   Positive   100 mg/dL (2+)                 Microbiology Results (last 10 days)       ** No results found for the last 240 hours. **            XR Chest 1 View    Result Date: 5/30/2024  XR CHEST 1 VW Date of Exam: 5/30/2024 9:41 PM EDT Indication: dyspnea, productive cough Comparison: None available. Findings: Lines: None Lungs: Poor respiratory effort accentuates the pulmonary vasculature and cardiomediastinal silhouette. No definite consolidation. Pleura: No pleural effusion or pneumothorax. Cardiomediastinum: The cardiomediastinal silhouette is normal Soft Tissues: Unremarkable. Bones: No acute osseous abnormality.     Impression: Impression: No acute abnormality. Electronically Signed: Jaiden Mcdonnell DO  5/30/2024 10:19 PM EDT  Workstation ID: [id number]    CT Abdomen Pelvis With Contrast    Result Date: 5/30/2024  CT ABDOMEN PELVIS W CONTRAST Date of Exam: 5/30/2024 8:04 PM EDT Indication: alcohol abuse abd/ left flank pain. Comparison: None available. Technique: Axial CT images were obtained of the abdomen and pelvis following the uneventful intravenous administration of 85 cc Isovue-300 IV contrast . Reconstructed coronal and sagittal images were also obtained. Automated exposure control and iterative  construction methods were used. FINDINGS: Lung bases: No masses. No consolidation. Liver: Extensive decreased hepatic attenuation suggesting hepatic steatosis. Spleen:No masses. No perisplenic hematoma. Pancreas:No pancreatic masses. No evidence of pancreatitis. Gallbladder and common bile duct: Prior cholecystectomy. Adrenal glands:No adrenal masses Kidneys and ureters:No kidney stones. No renal masses.No calculi present within the ureters. Normal caliber ureters. Urinary bladder: Urinary bladder wall thickening and urothelial enhancement. Small bowel:Normal caliber small bowel. Large bowel:No diverticulosis or diverticulitis. No large bowel masses are appreciated Appendix: Not seen however there is no evidence of appendicitis. GENITOURINARY: Prior hysterectomy Ascites or pneumoperitoneum:None. Adenopathy:None present Osseous structures: Degenerative changes of the hips and lumbar spine. Other findings: None     Impression: Hepatic steatosis. Urothelial enhancement of the urinary bladder suggesting cystitis. Electronically Signed: Deric Martinez MD  5/30/2024 8:20 PM EDT  Workstation ID: [id number]         Assessment & Plan  Assessment & Plan       Alcohol intoxication    COPD (chronic obstructive pulmonary disease)    Elevated LFTs    Hypokalemia    Gertrudis Perera is a 53 y.o. female with a history of COPD, alcohol abuse, presents to the ED with complaints of left flank pain that started yesterday.      Assessment and Plan:    ETOH  Elevated LFTs  THC use  Hepatic steatosis  -- drinks a pint of bourbon daily  -- Alkaline phos 232, , ALT 87  -- Ethanol 295  -- UDS positive for THC  -- CT abdomen pelvis shows hepatic steatosis.   -- Was given 2 L of saline in the ED  -- Consult addiction medicine like  -- CIWA protocol  -- Thiamine and multivitamin  -- Ativan as needed per protocol  -- Labs     Acute UTI  -- UA: Color orange, appearance turbid, ketones trace, blood trace, nitrite positive, leukocytes moderate,  protein 100, bilirubin moderate, urobilinogen 2.0, WBC TNTC, bacteria 4+, squamous 13-20  -- CT abdomen pelvis shows urothelial enhancement of the urinary bladder suggesting cystitis  -- BC x 2 pending  -- Urine culture  -- Rocephin  -- A.m. labs    Hypokalemia  -- K +2.9  -- Replacement per protocol  -- BMP in the a.m.    COPD  Tobacco abuse  -- duonebs  -- Patient is not on any home meds for her COPD due to not having any insurance.    -- consult case management  -- nicotine patch  -- smoking cessation education    Candidiasis  -- Nystatin powder to groin    DVT prophylaxis:  Lovenox    CODE STATUS:    Level Of Support Discussed With: Patient  Code Status (Patient has no pulse and is not breathing): CPR (Attempt to Resuscitate)  Medical Interventions (Patient has pulse or is breathing): Full Support      Expected Discharge  TBD  Expected discharge date/ time has not been documented.      Signature: Electronically signed by BEBA Calderon, 24, 10:24 PM EDT.     ---------------------    The patient was seen independently by the APC.  I was available for any questions or concerns.     Electronically signed by Julio César Hughes III, DO, 24, 1:48 AM EDT.                Electronically signed by Julio César Hughes III, DO at 24 0148          Physical Therapy Notes (most recent note)        Maya Martin, PT at 24 1412  Version 1 of 1         Patient Name: Gertrudis Perera  : 1970    MRN: [id number]                              Today's Date: 2024       Admit Date: 2024    Visit Dx:     ICD-10-CM ICD-9-CM   1. Alcohol withdrawal syndrome with complication  F10.939 291.81   2. Acute cystitis without hematuria  N30.00 595.0   3. Hypokalemia  E87.6 276.8     Patient Active Problem List   Diagnosis    Alcohol intoxication    COPD (chronic obstructive pulmonary disease)    Elevated LFTs    Hypokalemia     Past Medical History:   Diagnosis Date    COPD (chronic  obstructive pulmonary disease)      Past Surgical History:   Procedure Laterality Date    CHOLECYSTECTOMY      HYSTERECTOMY        General Information       Row Name 06/01/24 1437          Physical Therapy Time and Intention    Document Type evaluation  -LM     Mode of Treatment physical therapy  -       Row Name 06/01/24 1437          General Information    Patient Profile Reviewed yes  -LM     Prior Level of Function independent:;all household mobility;gait;ADL's  -LM     Existing Precautions/Restrictions fall;oxygen therapy device and L/min;seizures  -LM     Barriers to Rehab medically complex;ineffective coping  -LM       Row Name 06/01/24 1437          Living Environment    People in Home alone  -LM       Row Name 06/01/24 1437          Home Main Entrance    Number of Stairs, Main Entrance four  -LM     Stair Railings, Main Entrance railing on right side (ascending)  -LM       Row Name 06/01/24 1437          Stairs Within Home, Primary    Number of Stairs, Within Home, Primary none  -LM       Row Name 06/01/24 1437          Cognition    Orientation Status (Cognition) oriented x 4  -LM       Row Name 06/01/24 1437          Safety Issues, Functional Mobility    Safety Issues Affecting Function (Mobility) awareness of need for assistance;insight into deficits/self-awareness;judgment;safety precaution awareness;safety precautions follow-through/compliance;sequencing abilities  -LM     Impairments Affecting Function (Mobility) balance;coordination;endurance/activity tolerance;strength  -LM               User Key  (r) = Recorded By, (t) = Taken By, (c) = Cosigned By      Initials Name Provider Type    LM Maya Martin PT Physical Therapist                   Mobility       Row Name 06/01/24 1442          Bed Mobility    Bed Mobility supine-sit;sit-supine  -LM     Supine-Sit [place] (Bed Mobility) moderate assist (50% patient effort);1 person assist;verbal cues  -LM     Sit-Supine [place] (Bed Mobility)  moderate assist (50% patient effort);1 person assist;verbal cues  -LM     Assistive Device (Bed Mobility) bed rails;head of bed elevated  -LM       Row Name 06/01/24 1442          Bed-Chair Transfer    Bed-Chair [place] (Transfers) moderate assist (50% patient effort);1 person assist;verbal cues  -LM     Comment, (Bed-Chair Transfer) 2 stand pivot transfers completed from bed-->BSC-->bed.  ModA needed with pericare.  Pt very tremulous during activity with elevated HR.  -LM       Row Name 06/01/24 1442          Sit-Stand Transfer    Sit-Stand [place] (Transfers) moderate assist (50% patient effort);1 person assist;verbal cues  -LM       Row Name 06/01/24 1442          Gait/Stairs (Locomotion)    [place] Level (Gait) unable to assess  -LM     Comment, (Gait/Stairs) Not safe to attempt at this time.  -LM               User Key  (r) = Recorded By, (t) = Taken By, (c) = Cosigned By      Initials Name Provider Type    LM Maya Martin PT Physical Therapist                   Obj/Interventions       Row Name 06/01/24 1445          Range of Motion Comprehensive    General Range of Motion bilateral lower extremity ROM WFL  -LM       [facility] Name 06/01/24 1445          Strength Comprehensive (MMT)    General Manual Muscle Testing (MMT) Assessment lower extremity strength deficits identified  -LM     Comment, General Manual Muscle Testing (MMT) Assessment BLEs grossly 4-/5 throughout  -LM       [facility] Name 06/01/24 1445          Balance    Balance Assessment sitting static balance;standing static balance;standing dynamic balance  -LM     Static Sitting Balance standby assist  -LM     Position, Sitting Balance unsupported;sitting edge of bed  -LM     Static Standing Balance moderate assist;1-person assist  -LM     Dynamic Standing Balance moderate assist;1-person assist  -LM       Row Name 06/01/24 1445          Sensory Assessment (Somatosensory)    Sensory Assessment (Somatosensory) LE sensation intact  -LM                User Key  (r) = Recorded By, (t) = Taken By, (c) = Cosigned By      Initials Name Provider Type    LM Maya Martin, PT Physical Therapist                   Goals/Plan       Row Name 06/01/24 1451          Bed Mobility Goal 1 (PT)    Activity/Assistive Device (Bed Mobility Goal 1, PT) sit to supine/supine to sit  -LM     [place] Level/Cues Needed (Bed Mobility Goal 1, PT) contact guard required  -LM     Time Frame (Bed Mobility Goal 1, PT) short term goal (STG);5 days  -LM       Row Name 06/01/24 1451          Transfer Goal 1 (PT)    Activity/Assistive Device (Transfer Goal 1, PT) bed-to-chair/chair-to-bed  -LM     [place] Level/Cues Needed (Transfer Goal 1, PT) standby assist  -LM     Time Frame (Transfer Goal 1, PT) long term goal (LTG);10 days  -LM       Row Name 06/01/24 1451          Gait Training Goal 1 (PT)    Activity/Assistive Device (Gait Training Goal 1, PT) gait (walking locomotion);assistive device use  -LM     [place] Level (Gait Training Goal 1, PT) standby assist  -LM     Distance (Gait Training Goal 1, PT) 200 feet  -LM     Time Frame (Gait Training Goal 1, PT) long term goal (LTG);10 days  -LM       Row Name 06/01/24 1451          Therapy Assessment/Plan (PT)    Planned Therapy Interventions (PT) balance training;bed mobility training;gait training;home exercise program;motor coordination training;neuromuscular re-education;patient/family education;postural re-education;ROM (range of motion);stair training;strengthening;stretching;transfer training  -LM               User Key  (r) = Recorded By, (t) = Taken By, (c) = Cosigned By      Initials Name Provider Type    LM Maya Martin, PT Physical Therapist                   Clinical Impression       Row Name 06/01/24 1445          Pain    Pretreatment Pain Rating 0/10 - no pain  -LM     Posttreatment Pain Rating 0/10 - no pain  -LM       Row Name 06/01/24 1445          Plan of Care Review    Plan of Care Reviewed With patient  -LM      Outcome Evaluation PT evaluation completed.  Pt required ModA with all mobility including a stand pivot transfer to/from [facility].  Pt presents below baseline function d/t weakness, decreased activity tolerance, and gait instability.  Recommend inpt rehab at d/c.  -LM       Row Name 06/01/24 1445          Therapy Assessment/Plan (PT)    Patient/Family Therapy Goals Statement (PT) to return to [facility]  -     Rehab Potential (PT) good, to achieve stated therapy goals  -LM     Criteria for Skilled Interventions Met (PT) yes;meets criteria  -LM     Therapy Frequency (PT) daily  -LM     Predicted Duration of Therapy Intervention (PT) 10 days  -LM       Row Name 06/01/24 1445          Vital Signs    Pretreatment Heart Rate (beats/min) 113  -LM     Posttreatment Heart Rate (beats/min) 106  -LM     Pre SpO2 (%) 95  -LM     O2 Delivery Pre Treatment supplemental O2  -LM     Post SpO2 (%) 95  -LM     O2 Delivery Post Treatment supplemental O2  -LM     Pre Patient Position Supine  -LM     Post Patient Position Supine  -LM       Row Name 06/01/24 1445          Positioning and Restraints    Pre-Treatment Position in bed  -LM     Post Treatment Position bed  -LM     In Bed supine;call light within reach;encouraged to call for assist;exit alarm on;notified nsg  -LM               User Key  (r) = Recorded By, (t) = Taken By, (c) = Cosigned By      Initials Name Provider Type    LM Maya Martin, PT Physical Therapist                   Outcome Measures       Row Name 06/01/24 9090          How much help from another person do you currently need...    Turning from your back to your side while in flat bed without using bedrails? 3  -LM     Moving from lying on back to sitting on the side of a flat bed without bedrails? 2  -LM     Moving to and from a bed to a chair (including a wheelchair)? 2  -LM     Standing up from a chair using your arms (e.g., wheelchair, bedside chair)? 2  -LM     Climbing 3-5 steps with a railing? 2  -LM     To  walk in hospital room? 2  -LM     AM-PAC 6 Clicks Score (PT) 13  -LM     Highest Level of Mobility Goal 4 --> Transfer to chair/commode  -       Row Name 06/01/24 1452          Functional Assessment    Outcome Measure Options AM-PAC 6 Clicks Basic Mobility (PT)  -               User Key  (r) = Recorded By, (t) = Taken By, (c) = Cosigned By      Initials Name Provider Type    LM Maya Martin, PT Physical Therapist                                 Physical Therapy Education       Title: PT OT SLP Therapies (In Progress)       Topic: Physical Therapy (In Progress)       Point: Mobility training (In Progress)       Learning Progress Summary             Patient Acceptance, E, NR by LM at 6/1/2024 1453                         Point: Home exercise program (Not Started)       Learner Progress:  Not documented in this visit.              Point: Precautions (In Progress)       Learning Progress Summary             Patient Acceptance, E, NR by  at 6/1/2024 1453                                         User Key       Initials Effective Dates Name Provider Type Discipline     07/11/23 -  Maya Martin, PT Physical Therapist PT                  PT Recommendation and Plan  Planned Therapy Interventions (PT): balance training, bed mobility training, gait training, home exercise program, motor coordination training, neuromuscular re-education, patient/family education, postural re-education, ROM (range of motion), stair training, strengthening, stretching, transfer training  Plan of Care Reviewed With: patient  Outcome Evaluation: PT evaluation completed.  Pt required ModA with all mobility including a stand pivot transfer to/from [facility].  Pt presents below baseline function d/t weakness, decreased activity tolerance, and gait instability.  Recommend inpt rehab at d/c.     Time Calculation:   PT Evaluation Complexity  History, PT Evaluation Complexity: 3 or more personal factors and/or comorbidities  Examination of Body Systems  (PT Eval Complexity): total of 3 or more elements  Clinical Presentation (PT Evaluation Complexity): evolving  Clinical Decision Making (PT Evaluation Complexity): moderate complexity  Overall Complexity (PT Evaluation Complexity): moderate complexity     PT Charges       Row Name 24 1454             Time Calculation    Start Time 1412  -LM      PT Received On 24  -LM      PT Goal Re-Cert Due Date 24  -LM         Untimed Charges    PT Eval/Re-eval Minutes 46  -LM         Total Minutes    Untimed Charges Total Minutes 46  -LM       Total Minutes 46  -LM                User Key  (r) = Recorded By, (t) = Taken By, (c) = Cosigned By      Initials Name Provider Type    LM Maya Martin, PT Physical Therapist                  Therapy Charges for Today       Code Description Service Date Service Provider Modifiers Qty    [id number] HC PT EVAL MOD COMPLEXITY 4 2024 Maya Martin, PT GP 1            PT G-Codes  Outcome Measure Options: AM-PAC 6 Clicks Basic Mobility (PT)  AM-PAC 6 Clicks Score (PT): 13  PT Discharge Summary  Anticipated Discharge Disposition (PT): inpatient rehabilitation facility    Maya Martin PT  2024      Electronically signed by Maya Martin, PT at 24 1457          Occupational Therapy Notes (most recent note)        Madison Mcmillan, OT at 24 1430          Patient Name: Gertrudis Perera  : 1970    MRN: [id number]                              Today's Date: 2024       Admit Date: 2024    Visit Dx:     ICD-10-CM ICD-9-CM   1. Alcohol withdrawal syndrome with complication  F10.939 291.81   2. Acute cystitis without hematuria  N30.00 595.0   3. Hypokalemia  E87.6 276.8     Patient Active Problem List   Diagnosis    Alcohol intoxication    COPD (chronic obstructive pulmonary disease)    Elevated LFTs    Hypokalemia     Past Medical History:   Diagnosis Date    COPD (chronic obstructive pulmonary disease)      Past Surgical History:   Procedure Laterality  Date    CHOLECYSTECTOMY      HYSTERECTOMY        General Information       Row Name 06/01/24 1510          OT Time and Intention    Document Type evaluation  -VIKKI     Mode of Treatment occupational therapy  -VIKKI       Row Name 06/01/24 1510          General Information    Patient Profile Reviewed yes  -VIKKI     Prior Level of Function independent:;ADL's;bed mobility;transfer;all household mobility;community mobility;home management;driving;shopping;using stairs  -VIKKI     Existing Precautions/Restrictions fall;oxygen therapy device and L/min;seizures  -VIKKI     Barriers to Rehab medically complex;ineffective coping  -VIKKI       Row Name 06/01/24 1510          Living Environment    People in Home alone  -VIKKI       Row Name 06/01/24 1510          Home Main Entrance    Number of Stairs, Main Entrance four  -VIKKI     Stair Railings, Main Entrance railing on right side (ascending)  -VIKKI       Row Name 06/01/24 1510          Stairs Within Home, Primary    Number of Stairs, Within Home, Primary none  -VIKKI       Row Name 06/01/24 1510          Cognition    Orientation Status (Cognition) oriented x 3  -       Row Name 06/01/24 1510          Safety Issues, Functional Mobility    Safety Issues Affecting Function (Mobility) awareness of need for assistance;insight into deficits/self-awareness;judgment;problem-solving;safety precaution awareness;safety precautions follow-through/compliance;sequencing abilities  -VIKKI     Impairments Affecting Function (Mobility) balance;coordination;endurance/activity tolerance;motor planning;postural/trunk control;strength  -VIKKI               User Key  (r) = Recorded By, (t) = Taken By, (c) = Cosigned By      Initials Name Provider Type    Madison Howe OT Occupational Therapist                     Mobility/ADL's       Row Name 06/01/24 1513          Bed Mobility    Bed Mobility supine-sit;sit-supine;scooting/bridging  -VIKKI     Scooting/Bridging [place] (Bed Mobility) dependent (less than 25% patient  effort);2 person assist  -VIKKI     Supine-Sit [place] (Bed Mobility) moderate assist (50% patient effort);1 person assist;verbal cues  -VIKKI     Sit-Supine [place] (Bed Mobility) moderate assist (50% patient effort);1 person assist;verbal cues  -VIKKI     Assistive Device (Bed Mobility) bed rails;draw sheet;head of bed elevated  -VIKKI       Row Name 06/01/24 1513          Transfers    Transfers sit-stand transfer;toilet transfer  -VIKKI       Row Name 06/01/24 1513          Sit-Stand Transfer    Sit-Stand [place] (Transfers) moderate assist (50% patient effort);1 person assist;verbal cues  -VIKKI       Row Name 06/01/24 1513          Toilet Transfer    Type (Toilet Transfer) stand pivot/stand step;stand-sit;sit-stand  -VIKKI     [place] Level (Toilet Transfer) moderate assist (50% patient effort);1 person assist;verbal cues  -VIKKI     Assistive Device (Toilet Transfer) commode, bedside without drop arms  -VIKKI       Row Name 06/01/24 1513          Activities of Daily Living    BADL Assessment/Intervention toileting;grooming  -VIKKI       Row Name 06/01/24 1513          Toileting Assessment/Training    [place] Level (Toileting) adjust/manage clothing;perform perineal hygiene;moderate assist (50% patient effort)  -VIKKI     Assistive Devices (Toileting) commode, bedside without drop arms  -VIKKI     Position (Toileting) supported standing  -VIKKI     Comment, (Toileting) following BM  -VIKKI       Row Name 06/01/24 1513          Grooming Assessment/Training    [place] Level (Grooming) wash face, hands;set up  -VIKKI     Oral Care patient refused intervention  -VIKKI     Position (Grooming) sitting up in bed  -VIKKI               User Key  (r) = Recorded By, (t) = Taken By, (c) = Cosigned By      Initials Name Provider Type    Madison Howe OT Occupational Therapist                   Obj/Interventions       Row Name 06/01/24 1517          Sensory Assessment (Somatosensory)    Sensory Assessment (Somatosensory) UE sensation intact   -[facility] Name 06/01/24 1517          Vision Assessment/Intervention    Visual Impairment/Limitations WFL  -[facility] Name 06/01/24 1517          Range of Motion Comprehensive    General Range of Motion bilateral upper extremity ROM WFL  -[facility] Name 06/01/24 1517          Strength Comprehensive (MMT)    Comment, General Manual Muscle Testing (MMT) Assessment BUE's grossly 4-/5  -[facility] Name 06/01/24 1517          Balance    Balance Assessment sitting static balance;sitting dynamic balance;standing static balance;standing dynamic balance  -VIKKI     Static Sitting Balance supervision  -VIKKI     Dynamic Sitting Balance minimal assist  -VIKKI     Position, Sitting Balance unsupported;sitting edge of bed  -VIKKI     Static Standing Balance moderate assist;1-person assist;verbal cues  -VIKKI     Dynamic Standing Balance moderate assist;1-person assist;verbal cues  -VIKKI     Position/Device Used, Standing Balance unsupported  -VIKKI     Balance Interventions standing;occupation based/functional task  -VIKKI     Comment, Balance MaxA for toileting tasks in standing  -VIKKI               User Key  (r) = Recorded By, (t) = Taken By, (c) = Cosigned By      Initials Name Provider Type    VIKKI Madison Mcmillan OT Occupational Therapist                   Goals/Plan       [facility] Name 06/01/24 1524          Transfer Goal 1 (OT)    Activity/Assistive Device (Transfer Goal 1, OT) sit-to-stand/stand-to-sit;shower chair  -VIKKI     [place] Level/Cues Needed (Transfer Goal 1, OT) contact guard required  -VIKKI     Time Frame (Transfer Goal 1, OT) long term goal (LTG);10 days  -VIKKI     Progress/Outcome (Transfer Goal 1, OT) new goal  -[facility] Name 06/01/24 1524          Toileting Goal 1 (OT)    Activity/Device (Toileting Goal 1, OT) adjust/manage clothing;perform perineal hygiene  -VIKKI     [place] Level/Cues Needed (Toileting Goal 1, OT) minimum assist (75% or more patient effort)  -VIKKI     Time Frame (Toileting Goal 1, OT) short term goal  (STG);1 week  -VIKKI     Progress/Outcome (Toileting Goal 1, OT) new goal  -[facility] Name 06/01/24 1524          Therapy Assessment/Plan (OT)    Planned Therapy Interventions (OT) activity tolerance training;BADL retraining;functional balance retraining;occupation/activity based interventions;patient/caregiver education/training;ROM/therapeutic exercise;strengthening exercise;transfer/mobility retraining  -               User Key  (r) = Recorded By, (t) = Taken By, (c) = Cosigned By      Initials Name Provider Type    Madison Howe, OT Occupational Therapist                   Clinical Impression       [facility] Name 06/01/24 1520          Pain Assessment    Pretreatment Pain Rating 0/10 - no pain  -VIKKI     Posttreatment Pain Rating 0/10 - no pain  -[facility] Name 06/01/24 1520          Plan of Care Review    Plan of Care Reviewed With patient  -VIKKI     Outcome Evaluation OT eval completed. Pt presents with significant weakness, impaired balance, and poor activity tolerance impacting ADL's. Pt ModA for functional transfers, MaxA for toileting tasks, BELL for grooming while sitting up in bed. IP OT services warranted. Recommend IPR at discharge.  -[facility] Name 06/01/24 1520          Therapy Assessment/Plan (OT)    Patient/Family Therapy Goal Statement (OT) To get stronger  -VIKKI     Rehab Potential (OT) good, to achieve stated therapy goals  -VIKKI     Criteria for Skilled Therapeutic Interventions Met (OT) yes;meets criteria;skilled treatment is necessary  -VIKKI     Therapy Frequency (OT) daily  -VIKKI     Predicted Duration of Therapy Intervention (OT) 10 days  -[facility] Name 06/01/24 1520          Therapy Plan Review/Discharge Plan (OT)    Anticipated Discharge Disposition (OT) inpatient rehabilitation facility  -[facility] Name 06/01/24 1520          Vital Signs    Pre Systolic BP Rehab 143  -VIKKI     Pre Treatment Diastolic BP 98  -VIKKI     Post Systolic BP Rehab 144  -VIKKI     Post Treatment Diastolic   -VIKKI      Posttreatment Heart Rate (beats/min) 107  -VIKKI     Pre SpO2 (%) 95  -VIKKI     O2 Delivery Pre Treatment nasal cannula  -VIKKI     O2 Delivery Intra Treatment room air  -VIKKI     Post SpO2 (%) 98  -VIKKI     O2 Delivery Post Treatment room air  -VIKKI     Pre Patient Position Supine  -VIKKI     Intra Patient Position Standing  -VIKKI     Post Patient Position Supine  -VIKKI       Row Name 06/01/24 1520          Positioning and Restraints    Pre-Treatment Position in bed  -VIKKI     Post Treatment Position bed  -VIKKI     In Bed notified nsg;fowlers;call light within reach;encouraged to call for assist;exit alarm on  -VIKKI               User Key  (r) = Recorded By, (t) = Taken By, (c) = Cosigned By      Initials Name Provider Type    Madison Howe OT Occupational Therapist                   Outcome Measures       Row Name 06/01/24 1525          How much help from another is currently needed...    Putting on and taking off regular lower body clothing? 1  -VIKKI     Bathing (including washing, rinsing, and drying) 2  -VIKKI     Toileting (which includes using toilet bed pan or urinal) 2  -VIKKI     Putting on and taking off regular upper body clothing 3  -VIKKI     Taking care of personal grooming (such as brushing teeth) 3  -VIKKI     Eating meals 3  -VIKKI     AM-PAC 6 Clicks Score (OT) 14  -VIKKI       Row Name 06/01/24 1452          How much help from another person do you currently need...    Turning from your back to your side while in flat bed without using bedrails? 3  -LM     Moving from lying on back to sitting on the side of a flat bed without bedrails? 2  -LM     Moving to and from a bed to a chair (including a wheelchair)? 2  -LM     Standing up from a chair using your arms (e.g., wheelchair, bedside chair)? 2  -LM     Climbing 3-5 steps with a railing? 2  -LM     To walk in hospital room? 2  -LM     AM-PAC 6 Clicks Score (PT) 13  -LM     Highest Level of Mobility Goal 4 --> Transfer to chair/commode  -LM       Row Name 06/01/24 1525 06/01/24 8662        Functional Assessment    Outcome Measure Options AM-PAC 6 Clicks Daily Activity (OT)  -VIKKI AM-PAC 6 Clicks Basic Mobility (PT)  -LOVE              User Key  (r) = Recorded By, (t) = Taken By, (c) = Cosigned By      Initials Name Provider Type    LM Maya Martin, PT Physical Therapist    Madison Howe OT Occupational Therapist                    Occupational Therapy Education       Title: PT OT SLP Therapies (In Progress)       Topic: Occupational Therapy (In Progress)       Point: ADL training (Done)       Description:   Instruct learner(s) on proper safety adaptation and remediation techniques during self care or transfers.   Instruct in proper use of assistive devices.                  Learning Progress Summary             Patient Acceptance, E, VU by VIKKI at 6/1/2024 1946    Comment: OT POC                         Point: Home exercise program (Not Started)       Description:   Instruct learner(s) on appropriate technique for monitoring, assisting and/or progressing therapeutic exercises/activities.                  Learner Progress:  Not documented in this visit.              Point: Precautions (Not Started)       Description:   Instruct learner(s) on prescribed precautions during self-care and functional transfers.                  Learner Progress:  Not documented in this visit.              Point: Body mechanics (Not Started)       Description:   Instruct learner(s) on proper positioning and spine alignment during self-care, functional mobility activities and/or exercises.                  Learner Progress:  Not documented in this visit.                              User Key       Initials Effective Dates Name Provider Type Discipline    VIKKI 06/16/21 -  Madison Mcmillan OT Occupational Therapist OT                  OT Recommendation and Plan  Planned Therapy Interventions (OT): activity tolerance training, BADL retraining, functional balance retraining, occupation/activity based interventions, patient/caregiver  education/training, ROM/therapeutic exercise, strengthening exercise, transfer/mobility retraining  Therapy Frequency (OT): daily  Plan of Care Review  Plan of Care Reviewed With: patient  Outcome Evaluation: OT eval completed. Pt presents with significant weakness, impaired balance, and poor activity tolerance impacting ADL's. Pt ModA for functional transfers, MaxA for toileting tasks, BELL for grooming while sitting up in bed. IP OT services warranted. Recommend IPR at discharge.     Time Calculation:   Evaluation Complexity (OT)  Review Occupational Profile/Medical/Therapy History Complexity: expanded/moderate complexity  Assessment, Occupational Performance/Identification of Deficit Complexity: 3-5 performance deficits  Clinical Decision Making Complexity (OT): detailed assessment/moderate complexity  Overall Complexity of Evaluation (OT): moderate complexity     Time Calculation- OT       Row Name 06/01/24 1430             Time Calculation- OT    OT Start Time 1430  -VIKKI      OT Received On 06/01/24  -VIKKI      OT Goal Re-Cert Due Date 06/11/24  -VIKKI         Untimed Charges    OT Eval/Re-eval Minutes 48  -VIKKI         Total Minutes    Untimed Charges Total Minutes 48  -VIKKI       Total Minutes 48  -VIKKI                User Key  (r) = Recorded By, (t) = Taken By, (c) = Cosigned By      Initials Name Provider Type    Madison Howe OT Occupational Therapist                  Therapy Charges for Today       Code Description Service Date Service Provider Modifiers Qty    [id number] HC OT EVAL MOD COMPLEXITY 4 6/1/2024 Madison Mcmillan OT GO 1                 Madison Mcmillan OT  6/1/2024    Electronically signed by Madison Mcmillan OT at 06/01/24 1523

## 2024-06-04 NOTE — PROGRESS NOTES
[facility] Medicine Services  PROGRESS NOTE    Patient Name: Gertrudis Perera  : 1970  MRN: [id number]    Date of Admission: 2024  Primary Care Physician: Provider, No Known    Subjective   Subjective     CC:  Alcohol withdrawal    HPI:    Pt feeling better.  Shakes are better. +BM.  Tolerating diet.  Denies HA.       Objective   Objective     Vital Signs:   Temp:  [98.2 °F (36.8 °C)-98.7 °F (37.1 °C)] 98.7 °F (37.1 °C)  Heart Rate:  [100-103] 103  Resp:  [18-20] 18  BP: (118-132)/(70-89) 120/82     Physical Exam:    NAD, in bed, eating breakfast  MM moist  RRR  CTAB  Abd soft, NT  Normal affect  Alert, speech clear  No tremors noted.    Results Reviewed:  LAB RESULTS:      Lab 24  0351 24  0346 24  0656 24  0647 24  0411 24  0108 24  1940   WBC 6.91 5.19 5.29  --   --  5.27  --  5.47   HEMOGLOBIN 13.6 12.9 12.4  --   --  12.2  --  14.1   HEMATOCRIT 40.1 38.9 36.9  --   --  35.2  --  40.9   PLATELETS 166 111* 102*  --   --  111*  --  127*   NEUTROS ABS  --   --   --   --   --  3.14  --  2.85   IMMATURE GRANS (ABS)  --   --   --   --   --  0.04  --  0.03   LYMPHS ABS  --   --   --   --   --  1.59  --  2.00   MONOS ABS  --   --   --   --   --  0.42  --  0.47   EOS ABS  --   --   --   --   --  0.03  --  0.05   .2* 105.1* 104.2*  --   --  103.5*  --  103.5*   LACTATE  --   --   --  1.6 2.1* 2.4* 2.7*  --    PROTIME  --   --   --   --   --  13.5  --   --          Lab 24  0350 24  0449 24  1601 24  0346 24  0656 24  1237 24  0933 24  0108 24  1940   SODIUM  --  137  --  141 138  --   --   --  146* 146*   POTASSIUM  --  4.1 4.6 3.6 3.8 4.0  --    < > 3.2* 2.9*   CHLORIDE  --  103  --  104 100  --   --   --  107 101   CO2  --  24.0  --  27.0 26.0  --   --   --  26.0 31.0*   ANION GAP  --  10.0  --  10.0 12.0  --   --   --  13.0 14.0   BUN  --  5*  --  3*  2*  --   --   --  6 7   CREATININE  --  0.52*  --  0.58 0.59  --   --   --  0.61 0.73   EGFR  --  112.6  --  109.7 109.3  --   --   --  108.4 99.7   GLUCOSE  --  98  --  91 83  --   --   --  125* 121*   CALCIUM  --  8.9  --  8.5* 8.2*  --   --   --  7.7* 8.7   MAGNESIUM 1.8  --   --   --  1.9  --   --   --  1.5* 1.7   PHOSPHORUS 3.9  --   --   --  3.3 1.9* 1.5*  --  1.6*  --    HEMOGLOBIN A1C  --   --   --   --  5.20  --   --   --   --   --    TSH  --   --   --   --   --   --   --   --  1.160  --     < > = values in this interval not displayed.         Lab 06/03/24  0449 06/02/24  0346 06/01/24  0656 05/30/24 1940   TOTAL PROTEIN 6.2 5.8* 5.7* 7.1   ALBUMIN 3.0* 2.9* 2.9* 3.8   GLOBULIN 3.2 2.9 2.8 3.3   ALT (SGPT) 47* 53* 63* 87*   AST (SGOT) 90* 115* 177* 289*   BILIRUBIN 0.8 0.9 1.2 0.9   ALK PHOS 197* 206* 203* 232*         Lab 05/31/24  0108   PROTIME 13.5   INR 1.02                 Brief Urine Lab Results  (Last result in the past 365 days)        Color   Clarity   Blood   Leuk Est   Nitrite   Protein   CREAT   Urine HCG        05/30/24 1940 Orange   Turbid   Trace   Moderate (2+)   Positive   100 mg/dL (2+)                   Microbiology Results Abnormal       Procedure Component Value - Date/Time    Blood Culture - Blood, Arm, Right [[id number]]  (Normal) Collected: 05/30/24 2030    Lab Status: Preliminary result Specimen: Blood from Arm, Right Updated: 06/03/24 2101     Blood Culture No growth at 4 days    Blood Culture - Blood, Hand, Right [[id number]]  (Normal) Collected: 05/30/24 2050    Lab Status: Preliminary result Specimen: Blood from Hand, Right Updated: 06/03/24 2101     Blood Culture No growth at 4 days    Narrative:      Less than seven (7) mL's of blood was collected.  Insufficient quantity may yield false negative results.            No radiology results from the last 24 hrs        Current medications:  Scheduled Meds:enoxaparin, 40 mg, Subcutaneous, Daily  folic acid, 1 mg, Oral,  Daily  multivitamin with minerals, 1 tablet, Oral, Daily  nicotine, 1 patch, Transdermal, Nightly  nystatin, , Topical, Q12H  senna-docusate sodium, 2 tablet, Oral, BID  sodium chloride, 10 mL, Intravenous, Q12H  thiamine (B-1) IV, 200 mg, Intravenous, Q8H   Followed by  [START ON 6/5/2024] thiamine, 100 mg, Oral, Daily      Continuous Infusions:   PRN Meds:.•  benzonatate  •  senna-docusate sodium **AND** polyethylene glycol **AND** bisacodyl **AND** bisacodyl  •  Calcium Replacement - Follow Nurse / BPA Driven Protocol  •  guaifenesin  •  hydrOXYzine  •  ipratropium-albuterol  •  Magnesium Standard Dose Replacement - Follow Nurse / BPA Driven Protocol  •  nitroglycerin  •  ondansetron ODT **OR** ondansetron  •  Phosphorus Replacement - Follow Nurse / BPA Driven Protocol  •  Potassium Replacement - Follow Nurse / BPA Driven Protocol  •  sodium chloride  •  sodium chloride    Assessment & Plan   Assessment & Plan     Active Hospital Problems    Diagnosis  POA   • **Alcohol intoxication [F10.929]  Yes   • COPD (chronic obstructive pulmonary disease) [J44.9]  Unknown   • Elevated LFTs [R79.89]  Unknown   • Hypokalemia [E87.6]  Unknown      Resolved Hospital Problems   No resolved problems to display.        Brief Hospital Course to date:  Gertrudis Perera is a 53 y.o. female with history of COPD and alcohol abuse presents with signs of ETOH withdrawal in conjunction with UTI.     Alcohol withdrawal  Hepatic steatosis  Alcoholic hepatitis  - CIWA scores are low  - tapering scheduled ativan, continue PRN Benzodiazepines  - IV thiamine, po folate    UTI, E coli  Cystitis  - s/p rocephin    COPD  Tobacco abuse  - PRN nebs  - nicotine replacement    Productive cough-improved  - no infiltrate on CXR, no leukocytosis  - broaden workup as clinically indicated    Blurry vision  Lightheadedness/dizziness  - visual fields full on exam  - CT head without acute changes    Hypokalemia  - replace electrolytes  PRN    Thrombocytopenia--resolved  - likely secondary to alcohol abuse    Tachycardia-resolved  - likely secondary to withdrawal, EKG sinus    Hyperglycemia  - A1c 5.2    Generalized weakness  - PT/OT-recommending rehab    Expected Discharge Location and Transportation:   Expected Discharge   Expected Discharge Date: 6/5/2024; Expected Discharge Time:      DVT prophylaxis:  Medical DVT prophylaxis orders are present.         AM-PAC 6 Clicks Score (PT): 18 (06/04/24 5886)    CODE STATUS:   Code Status and Medical Interventions:   Ordered at: 05/30/24 4272     Level Of Support Discussed With:    Patient     Code Status (Patient has no pulse and is not breathing):    CPR (Attempt to Resuscitate)     Medical Interventions (Patient has pulse or is breathing):    Full Support       Leonie Walls MD  06/04/24

## 2024-06-05 LAB
BACTERIA UR QL AUTO: ABNORMAL /HPF
BILIRUB UR QL STRIP: ABNORMAL
CLARITY UR: ABNORMAL
COD CRY URNS QL: ABNORMAL /HPF
COLOR UR: ABNORMAL
GLUCOSE UR STRIP-MCNC: NEGATIVE MG/DL
HGB UR QL STRIP.AUTO: NEGATIVE
HYALINE CASTS UR QL AUTO: ABNORMAL /LPF
KETONES UR QL STRIP: ABNORMAL
LEUKOCYTE ESTERASE UR QL STRIP.AUTO: ABNORMAL
MUCOUS THREADS URNS QL MICRO: ABNORMAL /HPF
NITRITE UR QL STRIP: NEGATIVE
PH UR STRIP.AUTO: 5.5 [PH] (ref 5–8)
PROT UR QL STRIP: ABNORMAL
RBC # UR STRIP: ABNORMAL /HPF
REF LAB TEST METHOD: ABNORMAL
SP GR UR STRIP: 1.03 (ref 1–1.03)
SQUAMOUS #/AREA URNS HPF: ABNORMAL /HPF
UROBILINOGEN UR QL STRIP: ABNORMAL
WBC # UR STRIP: ABNORMAL /HPF

## 2024-06-05 PROCEDURE — 99231 SBSQ HOSP IP/OBS SF/LOW 25: CPT | Performed by: FAMILY MEDICINE

## 2024-06-05 PROCEDURE — 97530 THERAPEUTIC ACTIVITIES: CPT

## 2024-06-05 PROCEDURE — 87086 URINE CULTURE/COLONY COUNT: CPT | Performed by: FAMILY MEDICINE

## 2024-06-05 PROCEDURE — 25010000002 KETOROLAC TROMETHAMINE PER 15 MG: Performed by: NURSE PRACTITIONER

## 2024-06-05 PROCEDURE — 25010000002 ENOXAPARIN PER 10 MG: Performed by: NURSE PRACTITIONER

## 2024-06-05 PROCEDURE — 81001 URINALYSIS AUTO W/SCOPE: CPT | Performed by: FAMILY MEDICINE

## 2024-06-05 RX ORDER — KETOROLAC TROMETHAMINE 15 MG/ML
15 INJECTION, SOLUTION INTRAMUSCULAR; INTRAVENOUS ONCE
Status: COMPLETED | OUTPATIENT
Start: 2024-06-05 | End: 2024-06-05

## 2024-06-05 RX ADMIN — Medication 10 ML: at 20:44

## 2024-06-05 RX ADMIN — NYSTATIN: 100000 POWDER TOPICAL at 09:30

## 2024-06-05 RX ADMIN — Medication 1 TABLET: at 14:17

## 2024-06-05 RX ADMIN — GUAIFENESIN 200 MG: 200 SOLUTION ORAL at 20:43

## 2024-06-05 RX ADMIN — Medication 5 MG: at 20:43

## 2024-06-05 RX ADMIN — Medication 10 ML: at 09:35

## 2024-06-05 RX ADMIN — HYDROXYZINE HYDROCHLORIDE 25 MG: 25 TABLET ORAL at 09:29

## 2024-06-05 RX ADMIN — NYSTATIN: 100000 POWDER TOPICAL at 20:44

## 2024-06-05 RX ADMIN — GUAIFENESIN 200 MG: 200 SOLUTION ORAL at 05:19

## 2024-06-05 RX ADMIN — ENOXAPARIN SODIUM 40 MG: 100 INJECTION SUBCUTANEOUS at 09:29

## 2024-06-05 RX ADMIN — FOLIC ACID 1 MG: 1 TABLET ORAL at 09:29

## 2024-06-05 RX ADMIN — SERTRALINE 50 MG: 50 TABLET, FILM COATED ORAL at 14:17

## 2024-06-05 RX ADMIN — KETOROLAC TROMETHAMINE 15 MG: 15 INJECTION, SOLUTION INTRAMUSCULAR; INTRAVENOUS at 05:31

## 2024-06-05 RX ADMIN — HYDROXYZINE HYDROCHLORIDE 25 MG: 25 TABLET ORAL at 20:43

## 2024-06-05 NOTE — THERAPY TREATMENT NOTE
Patient Name: Gertrudis Perera  : 1970    MRN: [id number]                              Today's Date: 2024       Admit Date: 2024    Visit Dx:     ICD-10-CM ICD-9-CM   1. Alcohol withdrawal syndrome with complication  F10.939 291.81   2. Acute cystitis without hematuria  N30.00 595.0   3. Hypokalemia  E87.6 276.8     Patient Active Problem List   Diagnosis    Alcohol intoxication    COPD (chronic obstructive pulmonary disease)    Elevated LFTs    Hypokalemia     Past Medical History:   Diagnosis Date    COPD (chronic obstructive pulmonary disease)      Past Surgical History:   Procedure Laterality Date    CHOLECYSTECTOMY      HYSTERECTOMY        General Information       Row Name 24 1120          Physical Therapy Time and Intention    Document Type therapy note (daily note)  -AE     Mode of Treatment physical therapy  -AE       Row Name 24 1120          General Information    Patient Profile Reviewed yes  -AE     Existing Precautions/Restrictions fall;other (see comments)  CIWA  -AE     Barriers to Rehab medically complex;ineffective coping  -AE       Row Name 24 1120          Cognition    Orientation Status (Cognition) oriented x 3  -AE       Row Name 24 1120          Safety Issues, Functional Mobility    Safety Issues Affecting Function (Mobility) awareness of need for assistance;insight into deficits/self-awareness;safety precaution awareness;sequencing abilities  -AE     Impairments Affecting Function (Mobility) balance;endurance/activity tolerance;strength  -AE               User Key  (r) = Recorded By, (t) = Taken By, (c) = Cosigned By      Initials Name Provider Type    AE Carlos Manuel Casey PT Physical Therapist                   Mobility       Row Name 24 1121          Bed Mobility    Bed Mobility supine-sit;sit-supine  -AE     Supine-Sit Jerome (Bed Mobility) standby assist  -AE     Sit-Supine Jerome (Bed Mobility) standby assist  -AE     Assistive  Device (Bed Mobility) bed rails;head of bed elevated  -AE     Comment, (Bed Mobility) Cues needed to reduce impulsivity with bed mobility and transfers.  -AE       Row Name 06/05/24 1121          Transfers    Comment, (Transfers) Pt impulsively standing from EOB prior to instruction to do so.  -AE       Row Name 06/05/24 1121          Sit-Stand Transfer    Sit-Stand Spink (Transfers) contact guard  -AE       Row Name 06/05/24 1121          Gait/Stairs (Locomotion)    Spink Level (Gait) contact guard  -AE     Distance in Feet (Gait) 200  -AE     Deviations/Abnormal Patterns (Gait) bilateral deviations;hong decreased;gait speed decreased;stride length decreased  -AE     Comment, (Gait/Stairs) Pt demo improved balance, strength, and endurance with activity. Pt required 2 brief standing rest breaks d/t fatigue and dizziness with mobility. Pt demo significant improvement with mobility. Further distance limited by dizziness.  -AE               User Key  (r) = Recorded By, (t) = Taken By, (c) = Cosigned By      Initials Name Provider Type    AE Carlos Manuel Casey PT Physical Therapist                   Obj/Interventions       Row Name 06/05/24 1124          Balance    Balance Assessment sitting static balance;sitting dynamic balance;sit to stand dynamic balance;standing static balance;standing dynamic balance  -AE     Static Sitting Balance standby assist  -AE     Dynamic Sitting Balance standby assist  -AE     Position, Sitting Balance unsupported;sitting edge of bed  -AE     Sit to Stand Dynamic Balance contact guard  -AE     Static Standing Balance contact guard  -AE     Dynamic Standing Balance contact guard  -AE     Position/Device Used, Standing Balance unsupported  -AE               User Key  (r) = Recorded By, (t) = Taken By, (c) = Cosigned By      Initials Name Provider Type    Carlos Manuel Szymanski PT Physical Therapist                   Goals/Plan    No documentation.                  Clinical  Impression       Row Name 06/05/24 1125          Pain    Pretreatment Pain Rating 0/10 - no pain  -AE     Posttreatment Pain Rating 0/10 - no pain  -AE       Row Name 06/05/24 1125          Plan of Care Review    Plan of Care Reviewed With patient  -AE     Progress improving  -AE     Outcome Evaluation Pt demo significant improvement in balance and activity tolerance this session. Pt ambulated 200ft with CGA, unsupported. Discussed with patient about rehab vs home with PT, if patient continues to progress well may need HHPT instead.  -AE       Row Name 06/05/24 1125          Vital Signs    Pre Systolic BP Rehab 123  -AE     Pre Treatment Diastolic BP 82  -AE     Pretreatment Heart Rate (beats/min) 109  -AE     Posttreatment Heart Rate (beats/min) 106  -AE     Pre SpO2 (%) 95  -AE     O2 Delivery Pre Treatment room air  -AE     O2 Delivery Intra Treatment room air  -AE     Post SpO2 (%) 95  -AE     O2 Delivery Post Treatment room air  -AE     Pre Patient Position Supine  -AE     Intra Patient Position Standing  -AE     Post Patient Position Supine  -AE       Row Name 06/05/24 1125          Positioning and Restraints    Pre-Treatment Position in bed  -AE     Post Treatment Position bed  -AE     In Bed notified nsg;fowlers;call light within reach;encouraged to call for assist  exit alarm unchanged  -AE               User Key  (r) = Recorded By, (t) = Taken By, (c) = Cosigned By      Initials Name Provider Type    AE Carlos Manuel Casey, PT Physical Therapist                   Outcome Measures       Row Name 06/05/24 1128          How much help from another person do you currently need...    Turning from your back to your side while in flat bed without using bedrails? 3  -AE     Moving from lying on back to sitting on the side of a flat bed without bedrails? 3  -AE     Moving to and from a bed to a chair (including a wheelchair)? 3  -AE     Standing up from a chair using your arms (e.g., wheelchair, bedside chair)? 3  -AE      Climbing 3-5 steps with a railing? 3  -AE     To walk in hospital room? 3  -AE     AM-PAC 6 Clicks Score (PT) 18  -AE     Highest Level of Mobility Goal 6 --> Walk 10 steps or more  -AE       Row Name 06/05/24 1128          Functional Assessment    Outcome Measure Options AM-PAC 6 Clicks Basic Mobility (PT)  -AE               User Key  (r) = Recorded By, (t) = Taken By, (c) = Cosigned By      Initials Name Provider Type    AE Carlos Manuel Casey, PT Physical Therapist                                 Physical Therapy Education       Title: PT OT SLP Therapies (Done)       Topic: Physical Therapy (Done)       Point: Mobility training (Done)       Learning Progress Summary             Patient Acceptance, E, VU by AE at 6/5/2024 1104    Acceptance, E, VU by DV at 6/4/2024 1037    Acceptance, E, VU by DV at 6/3/2024 1006    Acceptance, E, VU by DV at 6/2/2024 1815    Acceptance, E, NR by LM at 6/1/2024 1453                         Point: Home exercise program (Done)       Learning Progress Summary             Patient Acceptance, E, VU by AE at 6/5/2024 1104    Acceptance, E, VU by DV at 6/4/2024 1037    Acceptance, E, VU by DV at 6/3/2024 1006    Acceptance, E, VU by DV at 6/2/2024 1815                         Point: Precautions (Done)       Learning Progress Summary             Patient Acceptance, E, VU by AE at 6/5/2024 1104    Acceptance, E, VU by DV at 6/4/2024 1037    Acceptance, E, VU by DV at 6/3/2024 1006    Acceptance, E, VU by DV at 6/2/2024 1815    Acceptance, E, NR by LM at 6/1/2024 1453                                         User Key       Initials Effective Dates Name Provider Type Discipline     07/11/23 -  Maya Martin, PT Physical Therapist PT    AE 09/21/21 -  Carlos Manuel Casey PT Physical Therapist PT    DV 01/12/24 -  Debbi Blanc, RN Registered Nurse Nurse                  PT Recommendation and Plan     Plan of Care Reviewed With: patient  Progress: improving  Outcome Evaluation: Pt demo significant  improvement in balance and activity tolerance this session. Pt ambulated 200ft with CGA, unsupported. Discussed with patient about rehab vs home with PT, if patient continues to progress well may need HHPT instead.     Time Calculation:         PT Charges       Row Name 06/05/24 1129             Time Calculation    Start Time 1104  -AE      PT Received On 06/05/24  -AE      PT Goal Re-Cert Due Date 06/11/24  -AE         Timed Charges    38379 - PT Therapeutic Activity Minutes 11  -AE         Total Minutes    Timed Charges Total Minutes 11  -AE       Total Minutes 11  -AE                User Key  (r) = Recorded By, (t) = Taken By, (c) = Cosigned By      Initials Name Provider Type    AE Carlos Manuel Casey, PT Physical Therapist                  Therapy Charges for Today       Code Description Service Date Service Provider Modifiers Qty    [id number] HC PT THERAPEUTIC ACT EA 15 MIN 6/5/2024 Carlos Manuel Casey PT GP 1            PT G-Codes  Outcome Measure Options: AM-PAC 6 Clicks Basic Mobility (PT)  AM-PAC 6 Clicks Score (PT): 18  AM-PAC 6 Clicks Score (OT): 14  PT Discharge Summary  Anticipated Discharge Disposition (PT): inpatient rehabilitation facility    Carlos Manuel Casey PT  6/5/2024

## 2024-06-05 NOTE — CASE MANAGEMENT/SOCIAL WORK
Continued Stay Note  [facility]     Patient Name: Gertrudis Perera  MRN: [id number]  Today's Date: 6/5/2024    Admit Date: 5/30/2024    Plan: Acute rehab   Discharge Plan       Row Name 06/05/24 1359       Plan    Plan Acute rehab    Patient/Family in Agreement with Plan yes    Plan Comments Met with patient at the bedside to discuss discharge plan. Patient's plan is acute rehab. Spoke with liason for [facility], who says she will continue to follow the patient but hasn't gotten approval from the doctor yet. CM will continue to follow.    Final Discharge Disposition Code 62 - inpatient rehab facility                   Discharge Codes    No documentation.                 Expected Discharge Date and Time       Expected Discharge Date Expected Discharge Time    Jun 5, 2024               Maki Samano RN

## 2024-06-05 NOTE — PLAN OF CARE
Goal Outcome Evaluation:  Plan of Care Reviewed With: patient        Progress: improving  Outcome Evaluation: Pt demo significant improvement in balance and activity tolerance this session. Pt ambulated 200ft with CGA, unsupported. Discussed with patient about rehab vs home with PT, if patient continues to progress well may need HHPT instead.      Anticipated Discharge Disposition (PT): inpatient rehabilitation facility

## 2024-06-05 NOTE — PROGRESS NOTES
[facility] Medicine Services  PROGRESS NOTE    Patient Name: Gertrudis Perera  : 1970  MRN: [id number]    Date of Admission: 2024  Primary Care Physician: Provider, No Known    Subjective   Subjective     CC:  Anxiety    HPI:  Patient reports she is feeling very anxious.  She is nervous that her scheduled Ativan has been stopped.  I explained that it was on a schedule to complete.  We discussed starting Zoloft and she is agreeable.  Still waiting for approval for rehab.      Objective   Objective     Vital Signs:   Temp:  [97.7 °F (36.5 °C)-98.6 °F (37 °C)] 98.6 °F (37 °C)  Heart Rate:  [] 94  Resp:  [18-19] 18  BP: (107-123)/(67-82) 107/67     Physical Exam:  Constitutional: No acute distress, awake, alert  HENT: NCAT, mucous membranes moist  Respiratory: Clear to auscultation bilaterally, respiratory effort normal   Cardiovascular: RRR, no murmurs, rubs, or gallops  Gastrointestinal: Positive bowel sounds, soft, nontender, nondistended  Musculoskeletal: No bilateral ankle edema  Psychiatric: Anxious, cooperative  Neurologic: Oriented x 3, strength symmetric in all extremities, Cranial Nerves grossly intact to confrontation, speech clear  Skin: No rashes      Results Reviewed:  LAB RESULTS:      Lab 24  0351 24  0346 24  0656 24  0647 24  0411 24  0108 246 24  1940   WBC 6.91 5.19 5.29  --   --  5.27  --  5.47   HEMOGLOBIN 13.6 12.9 12.4  --   --  12.2  --  14.1   HEMATOCRIT 40.1 38.9 36.9  --   --  35.2  --  40.9   PLATELETS 166 111* 102*  --   --  111*  --  127*   NEUTROS ABS  --   --   --   --   --  3.14  --  2.85   IMMATURE GRANS (ABS)  --   --   --   --   --  0.04  --  0.03   LYMPHS ABS  --   --   --   --   --  1.59  --  2.00   MONOS ABS  --   --   --   --   --  0.42  --  0.47   EOS ABS  --   --   --   --   --  0.03  --  0.05   .2* 105.1* 104.2*  --   --  103.5*  --  103.5*   LACTATE  --   --   --  1.6 2.1*  2.4* 2.7*  --    PROTIME  --   --   --   --   --  13.5  --   --          Lab 06/04/24  0350 06/03/24 0449 06/02/24  1601 06/02/24 0346 06/01/24 0656 05/31/24 2029 05/31/24  1237 05/31/24  0933 05/31/24  0108 05/30/24 1940   SODIUM  --  137  --  141 138  --   --   --  146* 146*   POTASSIUM  --  4.1 4.6 3.6 3.8 4.0  --    < > 3.2* 2.9*   CHLORIDE  --  103  --  104 100  --   --   --  107 101   CO2  --  24.0  --  27.0 26.0  --   --   --  26.0 31.0*   ANION GAP  --  10.0  --  10.0 12.0  --   --   --  13.0 14.0   BUN  --  5*  --  3* 2*  --   --   --  6 7   CREATININE  --  0.52*  --  0.58 0.59  --   --   --  0.61 0.73   EGFR  --  112.6  --  109.7 109.3  --   --   --  108.4 99.7   GLUCOSE  --  98  --  91 83  --   --   --  125* 121*   CALCIUM  --  8.9  --  8.5* 8.2*  --   --   --  7.7* 8.7   MAGNESIUM 1.8  --   --   --  1.9  --   --   --  1.5* 1.7   PHOSPHORUS 3.9  --   --   --  3.3 1.9* 1.5*  --  1.6*  --    HEMOGLOBIN A1C  --   --   --   --  5.20  --   --   --   --   --    TSH  --   --   --   --   --   --   --   --  1.160  --     < > = values in this interval not displayed.         Lab 06/03/24 0449 06/02/24 0346 06/01/24 0656 05/30/24 1940   TOTAL PROTEIN 6.2 5.8* 5.7* 7.1   ALBUMIN 3.0* 2.9* 2.9* 3.8   GLOBULIN 3.2 2.9 2.8 3.3   ALT (SGPT) 47* 53* 63* 87*   AST (SGOT) 90* 115* 177* 289*   BILIRUBIN 0.8 0.9 1.2 0.9   ALK PHOS 197* 206* 203* 232*         Lab 05/31/24  0108   PROTIME 13.5   INR 1.02                 Brief Urine Lab Results  (Last result in the past 365 days)        Color   Clarity   Blood   Leuk Est   Nitrite   Protein   CREAT   Urine HCG        05/30/24 1940 Orange   Turbid   Trace   Moderate (2+)   Positive   100 mg/dL (2+)                   Microbiology Results Abnormal       Procedure Component Value - Date/Time    Blood Culture - Blood, Arm, Right [[id number]]  (Normal) Collected: 05/30/24 2030    Lab Status: Final result Specimen: Blood from Arm, Right Updated: 06/04/24 2101     Blood Culture  No growth at 5 days    Blood Culture - Blood, Hand, Right [[id number]]  (Normal) Collected: 05/30/24 2050    Lab Status: Final result Specimen: Blood from Hand, Right Updated: 06/04/24 2101     Blood Culture No growth at 5 days    Narrative:      Less than seven (7) mL's of blood was collected.  Insufficient quantity may yield false negative results.            No radiology results from the last 24 hrs        Current medications:  Scheduled Meds:enoxaparin, 40 mg, Subcutaneous, Daily  folic acid, 1 mg, Oral, Daily  multivitamin with minerals, 1 tablet, Oral, Daily  nicotine, 1 patch, Transdermal, Q24H  nystatin, , Topical, Q12H  senna-docusate sodium, 2 tablet, Oral, BID  sertraline, 50 mg, Oral, Daily  sodium chloride, 10 mL, Intravenous, Q12H  thiamine, 100 mg, Oral, Daily      Continuous Infusions:   PRN Meds:.•  benzonatate  •  senna-docusate sodium **AND** polyethylene glycol **AND** bisacodyl **AND** bisacodyl  •  Calcium Replacement - Follow Nurse / BPA Driven Protocol  •  guaifenesin  •  hydrOXYzine  •  ipratropium-albuterol  •  Magnesium Standard Dose Replacement - Follow Nurse / BPA Driven Protocol  •  melatonin  •  nitroglycerin  •  ondansetron ODT **OR** ondansetron  •  Phosphorus Replacement - Follow Nurse / BPA Driven Protocol  •  Potassium Replacement - Follow Nurse / BPA Driven Protocol  •  sodium chloride  •  sodium chloride    Assessment & Plan   Assessment & Plan     Active Hospital Problems    Diagnosis  POA   • **Alcohol intoxication [F10.929]  Yes   • COPD (chronic obstructive pulmonary disease) [J44.9]  Unknown   • Elevated LFTs [R79.89]  Unknown   • Hypokalemia [E87.6]  Unknown      Resolved Hospital Problems   No resolved problems to display.        Brief Hospital Course to date:  Gertrudis Perera is a 53 y.o. female with history of COPD and alcohol abuse presents with signs of ETOH withdrawal in conjunction with UTI.      Alcohol withdrawal  Hepatic steatosis  Alcoholic hepatitis  - CIWA scores  are low  - tapered scheduled ativan, continue PRN Benzodiazepines  - IV thiamine, po folate  -Added Zoloft on 6/5/2024     UTI, E coli  Cystitis  - s/p rocephin     COPD  Tobacco abuse  - PRN nebs  - nicotine replacement     Productive cough-improved  - no infiltrate on CXR, no leukocytosis     Blurry vision  Lightheadedness/dizziness  - visual fields full on exam  - CT head without acute changes     Hypokalemia  - replace electrolytes PRN     Thrombocytopenia--resolved  - likely secondary to alcohol abuse     Tachycardia-resolved  - likely secondary to withdrawal, EKG sinus     Hyperglycemia  - A1c 5.2     Generalized weakness  - PT/OT-recommending rehab        Expected Discharge Location and Transportation: Rehab  Expected Discharge   Expected Discharge Date: 6/6/2024; Expected Discharge Time:      DVT prophylaxis:  Medical DVT prophylaxis orders are present.         AM-PAC 6 Clicks Score (PT): 18 (06/05/24 1128)    CODE STATUS:   Code Status and Medical Interventions:   Ordered at: 05/30/24 3360     Level Of Support Discussed With:    Patient     Code Status (Patient has no pulse and is not breathing):    CPR (Attempt to Resuscitate)     Medical Interventions (Patient has pulse or is breathing):    Full Support       Nicole Sepulveda MD  06/05/24

## 2024-06-05 NOTE — PLAN OF CARE
Problem: Adult Inpatient Plan of Care  Goal: Plan of Care Review  Outcome: Ongoing, Progressing  Flowsheets (Taken 6/5/2024 1228)  Plan of Care Reviewed With: patient  Goal: Patient-Specific Goal (Individualized)  Outcome: Ongoing, Progressing  Goal: Absence of Hospital-Acquired Illness or Injury  Outcome: Ongoing, Progressing  Intervention: Prevent Skin Injury  Description: Perform a screening for skin injury risk, such as pressure or moisture associated skin damage on admission and at regular intervals throughout hospital stay.  Keep all areas of skin (especially folds) clean and dry.  Maintain adequate skin hydration.  Relieve and redistribute pressure and protect bony prominences; implement measures based on patient-specific risk factors.  Match turning and repositioning schedule to clinical condition.  Encourage weight shift frequently; assist with reposition if unable to complete independently.  Float heels off bed; avoid pressure on the Achilles tendon.  Keep skin free from extended contact with medical devices.  Encourage functional activity and mobility, as early as tolerated.  Use aids (e.g., slide boards, mechanical lift) during transfer.  Recent Flowsheet Documentation  Taken 6/5/2024 0800 by Malu Wood RN  Body Position: position changed independently  Skin Protection: incontinence pads utilized  Goal: Optimal Comfort and Wellbeing  Outcome: Ongoing, Progressing  Goal: Readiness for Transition of Care  Outcome: Ongoing, Progressing     Problem: COPD (Chronic Obstructive Pulmonary Disease) Comorbidity  Goal: Maintenance of COPD Symptom Control  Outcome: Ongoing, Progressing     Problem: Suicide Risk  Goal: Absence of Self-Harm  Outcome: Ongoing, Progressing   Goal Outcome Evaluation:  Plan of Care Reviewed With: patient

## 2024-06-06 PROCEDURE — 25010000002 ENOXAPARIN PER 10 MG: Performed by: NURSE PRACTITIONER

## 2024-06-06 PROCEDURE — 25010000002 ONDANSETRON PER 1 MG: Performed by: NURSE PRACTITIONER

## 2024-06-06 PROCEDURE — 99232 SBSQ HOSP IP/OBS MODERATE 35: CPT | Performed by: FAMILY MEDICINE

## 2024-06-06 RX ORDER — TRAMADOL HYDROCHLORIDE 50 MG/1
25 TABLET ORAL EVERY 6 HOURS PRN
Status: DISCONTINUED | OUTPATIENT
Start: 2024-06-06 | End: 2024-06-07 | Stop reason: HOSPADM

## 2024-06-06 RX ADMIN — SENNOSIDES AND DOCUSATE SODIUM 2 TABLET: 8.6; 5 TABLET ORAL at 07:50

## 2024-06-06 RX ADMIN — TRAMADOL HYDROCHLORIDE 25 MG: 50 TABLET ORAL at 02:29

## 2024-06-06 RX ADMIN — THIAMINE HCL TAB 100 MG 100 MG: 100 TAB at 07:57

## 2024-06-06 RX ADMIN — FOLIC ACID 1 MG: 1 TABLET ORAL at 07:51

## 2024-06-06 RX ADMIN — ONDANSETRON 4 MG: 2 INJECTION INTRAMUSCULAR; INTRAVENOUS at 17:32

## 2024-06-06 RX ADMIN — Medication 1 PATCH: at 17:32

## 2024-06-06 RX ADMIN — SERTRALINE 50 MG: 50 TABLET, FILM COATED ORAL at 07:50

## 2024-06-06 RX ADMIN — HYDROXYZINE HYDROCHLORIDE 25 MG: 25 TABLET ORAL at 07:51

## 2024-06-06 RX ADMIN — Medication 1 TABLET: at 07:50

## 2024-06-06 RX ADMIN — GUAIFENESIN 200 MG: 200 SOLUTION ORAL at 20:13

## 2024-06-06 RX ADMIN — TRAMADOL HYDROCHLORIDE 25 MG: 50 TABLET ORAL at 17:32

## 2024-06-06 RX ADMIN — Medication 5 MG: at 20:13

## 2024-06-06 RX ADMIN — HYDROXYZINE HYDROCHLORIDE 25 MG: 25 TABLET ORAL at 20:13

## 2024-06-06 RX ADMIN — ENOXAPARIN SODIUM 40 MG: 100 INJECTION SUBCUTANEOUS at 07:51

## 2024-06-06 NOTE — PROGRESS NOTES
[facility] Medicine Services  PROGRESS NOTE    Patient Name: Gertrudis Perera  : 1970  MRN: [id number]    Date of Admission: 2024  Primary Care Physician: Provider, No Known    Subjective   Subjective     CC:  Anxiety    HPI:   - Patient reports she is feeling very anxious.  She is nervous that her scheduled Ativan has been stopped.  I explained that it was on a schedule to complete.  We discussed starting Zoloft and she is agreeable.  Still waiting for approval for rehab.     -discussion with patient today regarding her social pressures and challenges with housing issues and a court proceeding.  She was discouraged from going to rehab but apparently has worked it out.  Will waiting approval from insurance/facility      Objective   Objective     Vital Signs:   Temp:  [98.6 °F (37 °C)-98.8 °F (37.1 °C)] 98.6 °F (37 °C)  Heart Rate:  [77-90] 77  Resp:  [18] 18  BP: (122-143)/(78-88) 143/88     Physical Exam:  Constitutional: No acute distress, awake, alert  HENT: NCAT, mucous membranes moist  Respiratory: Clear to auscultation bilaterally, respiratory effort normal   Cardiovascular: RRR, no murmurs, rubs, or gallops  Gastrointestinal: Positive bowel sounds, soft, nontender, nondistended  Musculoskeletal: No bilateral ankle edema  Psychiatric: Anxious, cooperative  Neurologic: Oriented x 3, strength symmetric in all extremities, Cranial Nerves grossly intact to confrontation, speech clear  Skin: No rashes      Results Reviewed:  LAB RESULTS:      Lab 24  0351 24  0346 24  0656 24  0647 24  0411 24  0108 24  2036 24  1940   WBC 6.91 5.19 5.29  --   --  5.27  --  5.47   HEMOGLOBIN 13.6 12.9 12.4  --   --  12.2  --  14.1   HEMATOCRIT 40.1 38.9 36.9  --   --  35.2  --  40.9   PLATELETS 166 111* 102*  --   --  111*  --  127*   NEUTROS ABS  --   --   --   --   --  3.14  --  2.85   IMMATURE GRANS (ABS)  --   --   --   --   --  0.04  --   0.03   LYMPHS ABS  --   --   --   --   --  1.59  --  2.00   MONOS ABS  --   --   --   --   --  0.42  --  0.47   EOS ABS  --   --   --   --   --  0.03  --  0.05   .2* 105.1* 104.2*  --   --  103.5*  --  103.5*   LACTATE  --   --   --  1.6 2.1* 2.4* 2.7*  --    PROTIME  --   --   --   --   --  13.5  --   --          Lab 06/04/24  0350 06/03/24  0449 06/02/24  1601 06/02/24  0346 06/01/24  0656 05/31/24 2029 05/31/24  1237 05/31/24  0933 05/31/24  0108 05/30/24  1940   SODIUM  --  137  --  141 138  --   --   --  146* 146*   POTASSIUM  --  4.1 4.6 3.6 3.8 4.0  --    < > 3.2* 2.9*   CHLORIDE  --  103  --  104 100  --   --   --  107 101   CO2  --  24.0  --  27.0 26.0  --   --   --  26.0 31.0*   ANION GAP  --  10.0  --  10.0 12.0  --   --   --  13.0 14.0   BUN  --  5*  --  3* 2*  --   --   --  6 7   CREATININE  --  0.52*  --  0.58 0.59  --   --   --  0.61 0.73   EGFR  --  112.6  --  109.7 109.3  --   --   --  108.4 99.7   GLUCOSE  --  98  --  91 83  --   --   --  125* 121*   CALCIUM  --  8.9  --  8.5* 8.2*  --   --   --  7.7* 8.7   MAGNESIUM 1.8  --   --   --  1.9  --   --   --  1.5* 1.7   PHOSPHORUS 3.9  --   --   --  3.3 1.9* 1.5*  --  1.6*  --    HEMOGLOBIN A1C  --   --   --   --  5.20  --   --   --   --   --    TSH  --   --   --   --   --   --   --   --  1.160  --     < > = values in this interval not displayed.         Lab 06/03/24  0449 06/02/24  0346 06/01/24  0656 05/30/24  1940   TOTAL PROTEIN 6.2 5.8* 5.7* 7.1   ALBUMIN 3.0* 2.9* 2.9* 3.8   GLOBULIN 3.2 2.9 2.8 3.3   ALT (SGPT) 47* 53* 63* 87*   AST (SGOT) 90* 115* 177* 289*   BILIRUBIN 0.8 0.9 1.2 0.9   ALK PHOS 197* 206* 203* 232*         Lab 05/31/24  0108   PROTIME 13.5   INR 1.02                 Brief Urine Lab Results  (Last result in the past 365 days)        Color   Clarity   Blood   Leuk Est   Nitrite   Protein   CREAT   Urine HCG        06/05/24 1721 Dark Yellow   Turbid   Negative   Trace   Negative   Trace                   Microbiology  Results Abnormal       Procedure Component Value - Date/Time    Urine Culture - Urine, Urine, Random Void [[id number]]  (Normal) Collected: 06/05/24 1721    Lab Status: Preliminary result Specimen: Urine, Random Void Updated: 06/06/24 0943     Urine Culture No growth    Blood Culture - Blood, Arm, Right [[id number]]  (Normal) Collected: 05/30/24 2030    Lab Status: Final result Specimen: Blood from Arm, Right Updated: 06/04/24 2101     Blood Culture No growth at 5 days    Blood Culture - Blood, Hand, Right [[id number]]  (Normal) Collected: 05/30/24 2050    Lab Status: Final result Specimen: Blood from Hand, Right Updated: 06/04/24 2101     Blood Culture No growth at 5 days    Narrative:      Less than seven (7) mL's of blood was collected.  Insufficient quantity may yield false negative results.            No radiology results from the last 24 hrs        Current medications:  Scheduled Meds:enoxaparin, 40 mg, Subcutaneous, Daily  folic acid, 1 mg, Oral, Daily  multivitamin with minerals, 1 tablet, Oral, Daily  nicotine, 1 patch, Transdermal, Q24H  nystatin, , Topical, Q12H  senna-docusate sodium, 2 tablet, Oral, BID  sertraline, 50 mg, Oral, Daily  sodium chloride, 10 mL, Intravenous, Q12H  thiamine, 100 mg, Oral, Daily      Continuous Infusions:   PRN Meds:.•  benzonatate  •  senna-docusate sodium **AND** polyethylene glycol **AND** bisacodyl **AND** bisacodyl  •  Calcium Replacement - Follow Nurse / BPA Driven Protocol  •  guaifenesin  •  hydrOXYzine  •  ipratropium-albuterol  •  Magnesium Standard Dose Replacement - Follow Nurse / BPA Driven Protocol  •  melatonin  •  nitroglycerin  •  ondansetron ODT **OR** ondansetron  •  Phosphorus Replacement - Follow Nurse / BPA Driven Protocol  •  Potassium Replacement - Follow Nurse / BPA Driven Protocol  •  sodium chloride  •  sodium chloride  •  traMADol    Assessment & Plan   Assessment & Plan     Active Hospital Problems    Diagnosis  POA   • **Alcohol intoxication  [F10.929]  Yes   • COPD (chronic obstructive pulmonary disease) [J44.9]  Unknown   • Elevated LFTs [R79.89]  Unknown   • Hypokalemia [E87.6]  Unknown      Resolved Hospital Problems   No resolved problems to display.        Brief Hospital Course to date:  Gertrudis Perera is a 53 y.o. female with history of COPD and alcohol abuse presents with signs of ETOH withdrawal in conjunction with UTI.      Alcohol withdrawal  Hepatic steatosis  Alcoholic hepatitis  - CIWA scores are low  - tapered scheduled ativan-now off, continue PRN Benzodiazepines  - IV thiamine, po folate  -Added Zoloft on 6/5/2024  -Referred for substance abuse rehab     UTI, E coli  Cystitis  - s/p rocephin     COPD  Tobacco abuse  - PRN nebs  - nicotine replacement     Productive cough-improved  - no infiltrate on CXR, no leukocytosis     Blurry vision  Lightheadedness/dizziness  - visual fields full on exam  - CT head without acute changes     Hypokalemia  - replace electrolytes PRN     Thrombocytopenia--resolved  - likely secondary to alcohol abuse     Tachycardia-resolved  - likely secondary to withdrawal, EKG sinus     Hyperglycemia  - A1c 5.2     Generalized weakness  - PT/OT-initially recommending rehab, improved, may now just go to substance abuse rehab facility        Expected Discharge Location and Transportation: Rehab  Expected Discharge   Expected Discharge Date: 6/7/2024; Expected Discharge Time:      DVT prophylaxis:  Medical DVT prophylaxis orders are present.         AM-PAC 6 Clicks Score (PT): 18 (06/05/24 1128)    CODE STATUS:   Code Status and Medical Interventions:   Ordered at: 05/30/24 1080     Level Of Support Discussed With:    Patient     Code Status (Patient has no pulse and is not breathing):    CPR (Attempt to Resuscitate)     Medical Interventions (Patient has pulse or is breathing):    Full Support       Nicole Sepulveda MD  06/06/24

## 2024-06-06 NOTE — CASE MANAGEMENT/SOCIAL WORK
"Continued Stay Note  [facility]     Patient Name: Gertrudis Perera  MRN: [id number]  Today's Date: 6/6/2024    Admit Date: 5/30/2024    Plan: Home With AUD Resources   Discharge Plan       Row Name 06/06/24 1252       Plan    Plan [facility]    Plan Comments Spoke to patient at bedside. When asked about going to AUD treatment the patient stated, \"I have things to take care of at home that involves the courts\".     Patient said she wants to go home and \"take care of things\" and then she can go to treatment.    Patient is concerned about being discharged today as she won't have transportation until tomorrow.    3:30 p.m. Patient has worked things out and is now interested in attending AUD residential treatment. She has changed her mind about [facility] and is now interested in [facility].    All required medical records have been sent to [facility] [place] for review.    6:00 p.m. The [facility] had been trying to reach the patient to complete the phone assessment. Our team went back to patient's room and connected her to complete this.    6:18 p.m. Message received from [facility]:  The client has been screened and has a bed reserved at our [place] facility for admission 6/7/24.  She reports that she will has a PT/OT eval in the morning.  We will need to review that  report before we send for our MD's admission orders.    Our team will continue to update.                   Discharge Codes    No documentation.                 Expected Discharge Date and Time       Expected Discharge Date Expected Discharge Time    Jun 6, 2024               Jordana Coy   Addiction Treatment Navigator  Ext. [phone]   "

## 2024-06-07 VITALS
RESPIRATION RATE: 18 BRPM | WEIGHT: 160 LBS | SYSTOLIC BLOOD PRESSURE: 127 MMHG | DIASTOLIC BLOOD PRESSURE: 82 MMHG | BODY MASS INDEX: 29.44 KG/M2 | OXYGEN SATURATION: 94 % | TEMPERATURE: 98.4 F | HEIGHT: 62 IN | HEART RATE: 91 BPM

## 2024-06-07 PROBLEM — F10.939 ALCOHOL WITHDRAWAL: Status: ACTIVE | Noted: 2024-06-07

## 2024-06-07 LAB
BACTERIA SPEC AEROBE CULT: NO GROWTH
QT INTERVAL: 362 MS
QTC INTERVAL: 430 MS

## 2024-06-07 PROCEDURE — 99239 HOSP IP/OBS DSCHRG MGMT >30: CPT | Performed by: FAMILY MEDICINE

## 2024-06-07 PROCEDURE — 25010000002 ONDANSETRON PER 1 MG: Performed by: NURSE PRACTITIONER

## 2024-06-07 PROCEDURE — 97530 THERAPEUTIC ACTIVITIES: CPT

## 2024-06-07 PROCEDURE — 25010000002 ENOXAPARIN PER 10 MG: Performed by: NURSE PRACTITIONER

## 2024-06-07 PROCEDURE — 93010 ELECTROCARDIOGRAM REPORT: CPT | Performed by: INTERNAL MEDICINE

## 2024-06-07 PROCEDURE — 93005 ELECTROCARDIOGRAM TRACING: CPT | Performed by: FAMILY MEDICINE

## 2024-06-07 RX ORDER — PROPRANOLOL HYDROCHLORIDE 10 MG/1
10 TABLET ORAL EVERY 12 HOURS
Qty: 60 TABLET | Refills: 1 | Status: SHIPPED | OUTPATIENT
Start: 2024-06-07 | End: 2024-08-06

## 2024-06-07 RX ORDER — FOLIC ACID 1 MG/1
1 TABLET ORAL DAILY
Qty: 30 TABLET | Refills: 1 | Status: SHIPPED | OUTPATIENT
Start: 2024-06-08 | End: 2024-08-07

## 2024-06-07 RX ORDER — HYDROXYZINE HYDROCHLORIDE 25 MG/1
25 TABLET, FILM COATED ORAL 3 TIMES DAILY PRN
Qty: 30 TABLET | Refills: 0 | Status: SHIPPED | OUTPATIENT
Start: 2024-06-07 | End: 2024-06-17

## 2024-06-07 RX ORDER — LANOLIN ALCOHOL/MO/W.PET/CERES
100 CREAM (GRAM) TOPICAL DAILY
Qty: 30 TABLET | Refills: 1 | Status: SHIPPED | OUTPATIENT
Start: 2024-06-08 | End: 2024-08-07

## 2024-06-07 RX ORDER — UREA 10 %
5 LOTION (ML) TOPICAL NIGHTLY PRN
Qty: 30 TABLET | Refills: 1 | Status: SHIPPED | OUTPATIENT
Start: 2024-06-07 | End: 2024-08-06

## 2024-06-07 RX ORDER — PROPRANOLOL HYDROCHLORIDE 10 MG/1
10 TABLET ORAL EVERY 12 HOURS
Status: DISCONTINUED | OUTPATIENT
Start: 2024-06-07 | End: 2024-06-07 | Stop reason: HOSPADM

## 2024-06-07 RX ORDER — ONDANSETRON 4 MG/1
4 TABLET, ORALLY DISINTEGRATING ORAL EVERY 6 HOURS PRN
Qty: 40 TABLET | Refills: 0 | Status: SHIPPED | OUTPATIENT
Start: 2024-06-07 | End: 2024-06-17

## 2024-06-07 RX ORDER — MULTIPLE VITAMINS W/ MINERALS TAB 9MG-400MCG
1 TAB ORAL DAILY
Qty: 30 TABLET | Refills: 1 | Status: SHIPPED | OUTPATIENT
Start: 2024-06-08 | End: 2024-08-07

## 2024-06-07 RX ADMIN — NYSTATIN: 100000 POWDER TOPICAL at 12:32

## 2024-06-07 RX ADMIN — ONDANSETRON 4 MG: 2 INJECTION INTRAMUSCULAR; INTRAVENOUS at 00:19

## 2024-06-07 RX ADMIN — Medication 1 PATCH: at 14:10

## 2024-06-07 RX ADMIN — Medication 1 TABLET: at 08:53

## 2024-06-07 RX ADMIN — THIAMINE HCL TAB 100 MG 100 MG: 100 TAB at 08:53

## 2024-06-07 RX ADMIN — FOLIC ACID 1 MG: 1 TABLET ORAL at 08:53

## 2024-06-07 RX ADMIN — ENOXAPARIN SODIUM 40 MG: 100 INJECTION SUBCUTANEOUS at 08:53

## 2024-06-07 RX ADMIN — PROPRANOLOL HYDROCHLORIDE 10 MG: 10 TABLET ORAL at 12:31

## 2024-06-07 RX ADMIN — SERTRALINE 50 MG: 50 TABLET, FILM COATED ORAL at 08:53

## 2024-06-07 NOTE — THERAPY TREATMENT NOTE
Patient Name: Gertrudis Perera  : 1970    MRN: [id number]                              Today's Date: 2024       Admit Date: 2024    Visit Dx:     ICD-10-CM ICD-9-CM   1. Alcohol withdrawal syndrome with complication  F10.939 291.81   2. Acute cystitis without hematuria  N30.00 595.0   3. Hypokalemia  E87.6 276.8     Patient Active Problem List   Diagnosis    Alcohol intoxication    COPD (chronic obstructive pulmonary disease)    Elevated LFTs    Hypokalemia     Past Medical History:   Diagnosis Date    COPD (chronic obstructive pulmonary disease)      Past Surgical History:   Procedure Laterality Date    CHOLECYSTECTOMY      HYSTERECTOMY        General Information       Row Name 24 0735          OT Time and Intention    Document Type therapy note (daily note)  -AC     Mode of Treatment occupational therapy  -AC       Row Name 2435          General Information    Patient Profile Reviewed yes  -AC     Existing Precautions/Restrictions other (see comments)  CIWA  -AC     Barriers to Rehab medically complex;ineffective coping  -AC       Row Name 2435          Cognition    Orientation Status (Cognition) oriented x 4  -AC       Row Name 2458          Safety Issues, Functional Mobility    Safety Issues Affecting Function (Mobility) insight into deficits/self-awareness  -AC     Impairments Affecting Function (Mobility) endurance/activity tolerance;strength  -AC               User Key  (r) = Recorded By, (t) = Taken By, (c) = Cosigned By      Initials Name Provider Type    AC Abril Polo, OT Occupational Therapist                     Mobility/ADL's       Row Name 24 0835          Bed Mobility    Bed Mobility supine-sit;sit-supine  -AC     Supine-Sit [place] (Bed Mobility) modified independence  -AC     Sit-Supine [place] (Bed Mobility) modified independence  -AC     Assistive Device (Bed Mobility) head of bed elevated  -AC       Row Name 24 7899           Transfers    Transfers sit-stand transfer  -AC       Row Name 06/07/24 0832          Sit-Stand Transfer    Sit-Stand [place] (Transfers) supervision  -       Row Name 06/07/24 0832          Functional Mobility    Functional Mobility- Ind. Level standby assist  -AC     Functional Mobility- Device other (see comments)  no AD  -AC     Functional Mobility-Distance (Feet) --  in hallway  -[facility] Name 06/07/24 0832          Activities of Daily Living    BADL Assessment/Intervention lower body dressing  -AC       [facility] Name 06/07/24 0832          Lower Body Dressing Assessment/Training    [place] Level (Lower Body Dressing) don;socks;independent  -AC     Position (Lower Body Dressing) edge of bed sitting  -AC               User Key  (r) = Recorded By, (t) = Taken By, (c) = Cosigned By      Initials Name Provider Type    Abril Mcmahon, OT Occupational Therapist                   Obj/Interventions       Row Name 06/07/24 0917          Balance    Balance Assessment sitting static balance;sitting dynamic balance;standing static balance;standing dynamic balance  -AC     Static Sitting Balance independent  -AC     Dynamic Sitting Balance independent  -AC     Position, Sitting Balance unsupported  -AC     Static Standing Balance supervision  -AC     Dynamic Standing Balance standby assist  -AC     Position/Device Used, Standing Balance unsupported  -AC               User Key  (r) = Recorded By, (t) = Taken By, (c) = Cosigned By      Initials Name Provider Type    Abril Mcmahon, OT Occupational Therapist                   Goals/Plan    No documentation.                  Clinical Impression       [facility] Name 06/07/24 0917          Pain Assessment    Pretreatment Pain Rating 0/10 - no pain  -     Posttreatment Pain Rating 0/10 - no pain  -AC       Row Name 06/07/24 0917          Plan of Care Review    Plan of Care Reviewed With patient  -AC     Progress improving  -     Outcome Evaluation Pt with good  progress, but continues below baseline with self care/mobility d/t decreased activity tolerance with elevated HR with minimal activity.  Pt ind LBD, SBA to ambulate in hallway without AD.  Continue with OT to progress toward PLOF.  -AC       Row Name 06/07/24 0917          Vital Signs    Pre Systolic BP Rehab 134  -AC     Pre Treatment Diastolic BP 82  -AC     Post Systolic BP Rehab 142  -AC     Post Treatment Diastolic BP 96  -AC     Pretreatment Heart Rate (beats/min) 95  -AC     Intratreatment Heart Rate (beats/min) 86  -AC     Posttreatment Heart Rate (beats/min) 133   -AC     Pre SpO2 (%) 92  -AC     O2 Delivery Pre Treatment room air  -AC     O2 Delivery Intra Treatment room air  -AC     Post SpO2 (%) 94  -AC     O2 Delivery Post Treatment room air  -AC     Pre Patient Position Supine  -AC     Post Patient Position Supine  -AC       Row Name 06/07/24 0917          Positioning and Restraints    Pre-Treatment Position in bed  -AC     Post Treatment Position bed  -AC     In Bed notified nsg;fowlers;call light within reach  -AC               User Key  (r) = Recorded By, (t) = Taken By, (c) = Cosigned By      Initials Name Provider Type    AC Abril Polo, OT Occupational Therapist                   Outcome Measures       Row Name 06/07/24 0921          How much help from another is currently needed...    Putting on and taking off regular lower body clothing? 4  -AC     Bathing (including washing, rinsing, and drying) 3  -AC     Toileting (which includes using toilet bed pan or urinal) 3  -AC     Putting on and taking off regular upper body clothing 4  -AC     Taking care of personal grooming (such as brushing teeth) 3  -AC     Eating meals 4  -AC     AM-PAC 6 Clicks Score (OT) 21  -AC       Row Name 06/07/24 0100          How much help from another person do you currently need...    Turning from your back to your side while in flat bed without using bedrails? 3  -ZN     Moving from lying on back to sitting on  the side of a flat bed without bedrails? 3  -ZN     Moving to and from a bed to a chair (including a wheelchair)? 3  -ZN     Standing up from a chair using your arms (e.g., wheelchair, bedside chair)? 3  -ZN     Climbing 3-5 steps with a railing? 3  -ZN     To walk in hospital room? 3  -ZN     AM-PAC 6 Clicks Score (PT) 18  -ZN     Highest Level of Mobility Goal 6 --> Walk 10 steps or more  -ZN       Row Name 06/07/24 0921          Functional Assessment    Outcome Measure Options AM-PAC 6 Clicks Daily Activity (OT)  -AC               User Key  (r) = Recorded By, (t) = Taken By, (c) = Cosigned By      Initials Name Provider Type    AC Abril Polo, OT Occupational Therapist    Margarito Newberry, RN Registered Nurse                    Occupational Therapy Education       Title: PT OT SLP Therapies (Done)       Topic: Occupational Therapy (Done)       Point: ADL training (Done)       Description:   Instruct learner(s) on proper safety adaptation and remediation techniques during self care or transfers.   Instruct in proper use of assistive devices.                  Learning Progress Summary             Patient Acceptance, E, VU by AC at 6/7/2024 0922    Acceptance, E, VU by DV at 6/4/2024 1037    Acceptance, E, VU by DV at 6/3/2024 1006    Acceptance, E, VU by DV at 6/2/2024 1815    Acceptance, E, VU by VIKKI at 6/1/2024 1526    Comment: OT POC                         Point: Home exercise program (Done)       Description:   Instruct learner(s) on appropriate technique for monitoring, assisting and/or progressing therapeutic exercises/activities.                  Learning Progress Summary             Patient Acceptance, E, VU by DV at 6/4/2024 1037    Acceptance, E, VU by DV at 6/3/2024 1006    Acceptance, E, VU by DV at 6/2/2024 1815                         Point: Precautions (Done)       Description:   Instruct learner(s) on prescribed precautions during self-care and functional transfers.                  Learning  Progress Summary             Patient Acceptance, E, VU by DV at 6/4/2024 1037    Acceptance, E, VU by DV at 6/3/2024 1006    Acceptance, E, VU by DV at 6/2/2024 1815                         Point: Body mechanics (Done)       Description:   Instruct learner(s) on proper positioning and spine alignment during self-care, functional mobility activities and/or exercises.                  Learning Progress Summary             Patient Acceptance, E, VU by DV at 6/4/2024 1037    Acceptance, E, VU by DV at 6/3/2024 1006    Acceptance, E, VU by DV at 6/2/2024 1815                                         User Key       Initials Effective Dates Name Provider Type Discipline    AC 02/03/23 -  Abril Polo, OT Occupational Therapist OT    VIKKI 06/16/21 -  Madison Mcmillan OT Occupational Therapist OT    DV 01/12/24 -  Debbi Blanc RN Registered Nurse Nurse                  OT Recommendation and Plan     Plan of Care Review  Plan of Care Reviewed With: patient  Progress: improving  Outcome Evaluation: Pt with good progress, but continues below baseline with self care/mobility d/t decreased activity tolerance with elevated HR with minimal activity.  Pt ind LBD, SBA to ambulate in hallway without AD.  Continue with OT to progress toward PLOF.     Time Calculation:         Time Calculation- OT       Row Name 06/07/24 0735             Time Calculation- OT    OT Start Time 0735  -AC      OT Received On 06/07/24  -AC      OT Goal Re-Cert Due Date 06/11/24  -AC         Timed Charges    27280 - OT Therapeutic Activity Minutes 15  -AC      07700 - OT Self Care/Mgmt Minutes 5  -AC         Total Minutes    Timed Charges Total Minutes 20  -AC       Total Minutes 20  -AC                User Key  (r) = Recorded By, (t) = Taken By, (c) = Cosigned By      Initials Name Provider Type    AC Abril Polo, OT Occupational Therapist                  Therapy Charges for Today       Code Description Service Date Service Provider Modifiers Qty     [id number]  OT THERAPEUTIC ACT EA 15 MIN 6/7/2024 Abril Polo, OT GO 1                 Abril Polo, GALINA  6/7/2024

## 2024-06-07 NOTE — THERAPY TREATMENT NOTE
Patient Name: Gertrudis Perera  : 1970    MRN: [id number]                              Today's Date: 2024       Admit Date: 2024    Visit Dx:     ICD-10-CM ICD-9-CM   1. Alcohol withdrawal syndrome with complication  F10.939 291.81   2. Acute cystitis without hematuria  N30.00 595.0   3. Hypokalemia  E87.6 276.8     Patient Active Problem List   Diagnosis    Alcohol intoxication    COPD (chronic obstructive pulmonary disease)    Elevated LFTs    Hypokalemia    Alcohol withdrawal     Past Medical History:   Diagnosis Date    COPD (chronic obstructive pulmonary disease)      Past Surgical History:   Procedure Laterality Date    CHOLECYSTECTOMY      HYSTERECTOMY        General Information       Row Name 24 1400          Physical Therapy Time and Intention    Document Type therapy note (daily note)  -AE     Mode of Treatment physical therapy  -AE       Row Name 24 1400          General Information    Patient Profile Reviewed yes  -AE     Existing Precautions/Restrictions other (see comments)  CIWA  -AE     Barriers to Rehab medically complex;ineffective coping  -AE       Row Name 24 1400          Cognition    Orientation Status (Cognition) oriented x 4  -AE       Row Name 24 1400          Safety Issues, Functional Mobility    Safety Issues Affecting Function (Mobility) safety precaution awareness;insight into deficits/self-awareness  -AE     Impairments Affecting Function (Mobility) endurance/activity tolerance;strength  -AE               User Key  (r) = Recorded By, (t) = Taken By, (c) = Cosigned By      Initials Name Provider Type    AE Carlos Manuel Casey PT Physical Therapist                   Mobility       Row Name 24 1401          Bed Mobility    Bed Mobility supine-sit;sit-supine  -AE     Supine-Sit [place] (Bed Mobility) independent  -AE     Sit-Supine [place] (Bed Mobility) independent  -AE     Assistive Device (Bed Mobility) head of bed elevated  -AE      Comment, (Bed Mobility) No cues needed for bed mobility.  -AE       Row Name 06/07/24 1401          Transfers    Comment, (Transfers) No cues needed for transfers. Pt reports occasional dizziness with mobility, slight dizziness with STS.  -AE       Row Name 06/07/24 1401          Sit-Stand Transfer    Sit-Stand [place] (Transfers) supervision  -AE       Row Name 06/07/24 1401          Gait/Stairs (Locomotion)    [place] Level (Gait) independent  -AE     Distance in Feet (Gait) 325  -AE     Deviations/Abnormal Patterns (Gait) gait speed decreased  -AE     Comment, (Gait/Stairs) Pt demo improved balance and endurance with activity this session. No cues or rest breaks needed this session. Slight dizziness with mobility. Further distance limited by fatigue.  -AE               User Key  (r) = Recorded By, (t) = Taken By, (c) = Cosigned By      Initials Name Provider Type    AE Carlos Manuel Casey, LISA Physical Therapist                   Obj/Interventions       Row Name 06/07/24 1406          Balance    Balance Assessment sitting static balance;sitting dynamic balance;sit to stand dynamic balance;standing static balance;standing dynamic balance  -AE     Static Sitting Balance independent  -AE     Dynamic Sitting Balance independent  -AE     Position, Sitting Balance unsupported;sitting edge of bed  -AE     Sit to Stand Dynamic Balance supervision  -AE     Static Standing Balance independent  -AE     Dynamic Standing Balance independent  -AE     Position/Device Used, Standing Balance unsupported  -AE               User Key  (r) = Recorded By, (t) = Taken By, (c) = Cosigned By      Initials Name Provider Type    AE Carlos Manuel Casey, PT Physical Therapist                   Goals/Plan    No documentation.                  Clinical Impression       Row Name 06/07/24 1412          Pain    Pretreatment Pain Rating 0/10 - no pain  -AE     Posttreatment Pain Rating 0/10 - no pain  -AE       Row Name 06/07/24 1412           Plan of Care Review    Plan of Care Reviewed With patient  -AE     Progress improving  -AE     Outcome Evaluation Pt continues to present with decreased activity tolerance compared to baseline. Pt ambulated 325ft independently this session. Pt has progressed since last session. Recommend D/C home with planned AUD treatment at d/c.  -AE       Row Name 06/07/24 1412          Vital Signs    Pre Systolic BP Rehab 127  -AE     Pre Treatment Diastolic BP 82  -AE     Pretreatment Heart Rate (beats/min) 79  -AE     Intratreatment Heart Rate (beats/min) 114  -AE     Posttreatment Heart Rate (beats/min) 85  -AE     Pre SpO2 (%) 96  -AE     Post SpO2 (%) 94  -AE     O2 Delivery Post Treatment room air  -AE     Pre Patient Position Supine  -AE     Intra Patient Position Standing  -AE     Post Patient Position Supine  -AE       Row Name 06/07/24 1412          Positioning and Restraints    Pre-Treatment Position in bed  -AE     Post Treatment Position bed  -AE     In Bed notified nsg;fowlers;call light within reach;encouraged to call for assist  -AE               User Key  (r) = Recorded By, (t) = Taken By, (c) = Cosigned By      Initials Name Provider Type    AE Carlos Manuel Casey, PT Physical Therapist                   Outcome Measures       Row Name 06/07/24 1415          How much help from another person do you currently need...    Turning from your back to your side while in flat bed without using bedrails? 4  -AE     Moving from lying on back to sitting on the side of a flat bed without bedrails? 4  -AE     Moving to and from a bed to a chair (including a wheelchair)? 3  -AE     Standing up from a chair using your arms (e.g., wheelchair, bedside chair)? 3  -AE     Climbing 3-5 steps with a railing? 3  -AE     To walk in hospital room? 3  -AE     AM-PAC 6 Clicks Score (PT) 20  -AE     Highest Level of Mobility Goal 6 --> Walk 10 steps or more  -AE       Row Name 06/07/24 1415 06/07/24 0921       Functional Assessment     Outcome Measure Options AM-PAC 6 Clicks Basic Mobility (PT)  -AE AM-PAC 6 Clicks Daily Activity (OT)  -AC              User Key  (r) = Recorded By, (t) = Taken By, (c) = Cosigned By      Initials Name Provider Type    AC Abril Polo, OT Occupational Therapist    Carlos Manuel Szymanski, PT Physical Therapist                                 Physical Therapy Education       Title: PT OT SLP Therapies (Resolved)       Topic: Physical Therapy (Resolved)       Point: Mobility training (Resolved)       Learning Progress Summary             Patient Acceptance, E, VU by AE at 6/5/2024 1104    Acceptance, E, VU by DV at 6/4/2024 1037    Acceptance, E, VU by DV at 6/3/2024 1006    Acceptance, E, VU by DV at 6/2/2024 1815    Acceptance, E, NR by LM at 6/1/2024 1453                         Point: Home exercise program (Resolved)       Learning Progress Summary             Patient Acceptance, E, VU by AE at 6/5/2024 1104    Acceptance, E, VU by DV at 6/4/2024 1037    Acceptance, E, VU by DV at 6/3/2024 1006    Acceptance, E, VU by DV at 6/2/2024 1815                         Point: Precautions (Resolved)       Learning Progress Summary             Patient Acceptance, E, VU by AE at 6/5/2024 1104    Acceptance, E, VU by DV at 6/4/2024 1037    Acceptance, E, VU by DV at 6/3/2024 1006    Acceptance, E, VU by DV at 6/2/2024 1815    Acceptance, E, NR by LM at 6/1/2024 1453                                         User Key       Initials Effective Dates Name Provider Type Discipline     07/11/23 -  Maya Martin, PT Physical Therapist PT    AE 09/21/21 -  Carlos Manuel Casey PT Physical Therapist PT    DV 01/12/24 -  Debbi Blanc, RN Registered Nurse Nurse                  PT Recommendation and Plan     Plan of Care Reviewed With: patient  Progress: improving  Outcome Evaluation: Pt continues to present with decreased activity tolerance compared to baseline. Pt ambulated 325ft independently this session. Pt has progressed since last session.  Recommend D/C home with planned AUD treatment at d/c.     Time Calculation:         PT Charges       Row Name 06/07/24 1416             Time Calculation    Start Time 1333  -AE      PT Received On 06/07/24  -AE      PT Goal Re-Cert Due Date 06/11/24  -AE         Timed Charges    66571 - PT Therapeutic Activity Minutes 8  -AE         Total Minutes    Timed Charges Total Minutes 8  -AE       Total Minutes 8  -AE                User Key  (r) = Recorded By, (t) = Taken By, (c) = Cosigned By      Initials Name Provider Type    AE Carlos Manuel Casey, PT Physical Therapist                  Therapy Charges for Today       Code Description Service Date Service Provider Modifiers Qty    98017495316 HC PT THERAPEUTIC ACT EA 15 MIN 6/7/2024 Carlos Manuel Casey, PT GP 1            PT G-Codes  Outcome Measure Options: AM-PAC 6 Clicks Basic Mobility (PT)  AM-PAC 6 Clicks Score (PT): 20  AM-PAC 6 Clicks Score (OT): 21  PT Discharge Summary  Anticipated Discharge Disposition (PT): home    Carlos Manuel Casey PT  6/7/2024

## 2024-06-07 NOTE — DISCHARGE SUMMARY
[facility] Medicine Services  DISCHARGE SUMMARY    Patient Name: Gertrudis Perera  : 1970  MRN: [id number]    Date of Admission: 2024  6:51 PM  Date of Discharge:  24    Primary Care Physician: Provider, No Known    Consults       No orders found from 2024 to 2024.            Hospital Course     Presenting Problem: Alcohol abuse    Active Hospital Problems    Diagnosis  POA   • **Alcohol intoxication [F10.929]  Yes   • Alcohol withdrawal [F10.939]  Yes   • COPD (chronic obstructive pulmonary disease) [J44.9]  Unknown   • Elevated LFTs [R79.89]  Unknown   • Hypokalemia [E87.6]  Unknown      Resolved Hospital Problems   No resolved problems to display.          Hospital Course:  Gertrudis Perera is a 53 y.o. female with history of COPD and alcohol abuse presented with signs of ETOH withdrawal in conjunction with UTI.      Alcohol withdrawal  Hepatic steatosis  Alcoholic hepatitis with concern for cirrhosis  Anxiety  -Treated per [facility] protocol, now resolved  - continue thiamine, folate  -Added Zoloft on 2024  -Referred for substance abuse rehab  -Encouraged continued alcohol cessation     UTI, E coli  Cystitis  -Completed course of antibiotics with rocephin     COPD  Tobacco abuse  -Courage cessation of smoking  - nicotine replacement as needed     Productive cough-improved  - no infiltrate on CXR, no leukocytosis     Blurry vision  Lightheadedness/dizziness  - visual fields full on exam  - CT head without acute changes     Hypokalemia  - replace electrolytes PRN     Thrombocytopenia--resolved  - likely secondary to alcohol abuse     Tachycardia-improved  - likely secondary to withdrawal, EKG sinus  -Started propranolol on 2024     Hyperglycemia  - A1c 5.2     Generalized weakness  - PT/OT-initially recommending rehab, improved  -Referred for rehab for substance abuse      Discharge Follow Up Recommendations for outpatient labs/diagnostics:  With PCP in 1 to 2  weeks to discuss medications, and new concern for cirrhosis of the liver and alcohol abuse.  Follow-up with GI in clinic within 1 month    Day of Discharge     HPI:   Patient reports heart rate increases when she gets up and moves around but rapidly recovers to below 100 with rest.  She still feels anxious.  She reports some mild nausea over the past couple of days.    Review of Systems  Some nausea, decreased appetite    Vital Signs:   Temp:  [98.1 °F (36.7 °C)-98.6 °F (37 °C)] 98.4 °F (36.9 °C)  Heart Rate:  [80-92] 91  Resp:  [18-19] 18  BP: (127-140)/(74-86) 127/82      Physical Exam:  Constitutional: No acute distress, awake, alert  HENT: NCAT, mucous membranes moist  Respiratory: Clear to auscultation bilaterally, respiratory effort normal   Cardiovascular: RRR  Gastrointestinal: Positive bowel sounds, soft, nontender, nondistended  Musculoskeletal: No bilateral ankle edema  Psychiatric: Appropriate affect, cooperative  Neurologic: Oriented x 3, strength symmetric in all extremities, Cranial Nerves grossly intact to confrontation, speech clear  Skin: No rashes      Pertinent  and/or Most Recent Results     LAB RESULTS:      Lab 06/04/24  0351 06/02/24  0346 06/01/24  0656   WBC 6.91 5.19 5.29   HEMOGLOBIN 13.6 12.9 12.4   HEMATOCRIT 40.1 38.9 36.9   PLATELETS 166 111* 102*   .2* 105.1* 104.2*         Lab 06/04/24  0350 06/03/24  0449 06/02/24  1601 06/02/24  0346 06/01/24  0656 05/31/24 2029   SODIUM  --  137  --  141 138  --    POTASSIUM  --  4.1 4.6 3.6 3.8 4.0   CHLORIDE  --  103  --  104 100  --    CO2  --  24.0  --  27.0 26.0  --    ANION GAP  --  10.0  --  10.0 12.0  --    BUN  --  5*  --  3* 2*  --    CREATININE  --  0.52*  --  0.58 0.59  --    EGFR  --  112.6  --  109.7 109.3  --    GLUCOSE  --  98  --  91 83  --    CALCIUM  --  8.9  --  8.5* 8.2*  --    MAGNESIUM 1.8  --   --   --  1.9  --    PHOSPHORUS 3.9  --   --   --  3.3 1.9*   HEMOGLOBIN A1C  --   --   --   --  5.20  --          Lab  06/03/24  0449 06/02/24  0346 06/01/24  0656   TOTAL PROTEIN 6.2 5.8* 5.7*   ALBUMIN 3.0* 2.9* 2.9*   GLOBULIN 3.2 2.9 2.8   ALT (SGPT) 47* 53* 63*   AST (SGOT) 90* 115* 177*   BILIRUBIN 0.8 0.9 1.2   ALK PHOS 197* 206* 203*                     Brief Urine Lab Results  (Last result in the past 365 days)        Color   Clarity   Blood   Leuk Est   Nitrite   Protein   CREAT   Urine HCG        06/05/24 1721 Dark Yellow   Turbid   Negative   Trace   Negative   Trace                 Microbiology Results (last 10 days)       Procedure Component Value - Date/Time    Urine Culture - Urine, Urine, Random Void [[id number]]  (Normal) Collected: 06/05/24 1721    Lab Status: Final result Specimen: Urine, Random Void Updated: 06/07/24 0956     Urine Culture No growth    Blood Culture - Blood, Hand, Right [[id number]]  (Normal) Collected: 05/30/24 2050    Lab Status: Final result Specimen: Blood from Hand, Right Updated: 06/04/24 2101     Blood Culture No growth at 5 days    Narrative:      Less than seven (7) mL's of blood was collected.  Insufficient quantity may yield false negative results.    Blood Culture - Blood, Arm, Right [[id number]]  (Normal) Collected: 05/30/24 2030    Lab Status: Final result Specimen: Blood from Arm, Right Updated: 06/04/24 2101     Blood Culture No growth at 5 days    Urine Culture - Urine, Urine, Clean Catch [[id number]]  (Abnormal)  (Susceptibility) Collected: 05/30/24 1940    Lab Status: Final result Specimen: Urine, Clean Catch Updated: 06/02/24 1022     Urine Culture >100,000 CFU/mL Escherichia coli    Narrative:      Colonization of the urinary tract without infection is common. Treatment is discouraged unless the patient is symptomatic, pregnant, or undergoing an invasive urologic procedure.    Susceptibility        Escherichia coli      FLORA      Amoxicillin + Clavulanate Intermediate      Ampicillin Resistant      Ampicillin + Sulbactam Intermediate      Cefazolin Susceptible      Cefepime  Susceptible      Ceftazidime Susceptible      Ceftriaxone Susceptible      Gentamicin Susceptible      Levofloxacin Intermediate      Nitrofurantoin Susceptible      Piperacillin + Tazobactam Susceptible      Trimethoprim + Sulfamethoxazole Resistant                                   CT Head Without Contrast    Result Date: 5/31/2024  CT HEAD WO CONTRAST Date of Exam: 5/31/2024 7:22 PM EDT Indication: blurry vision, dizziness. Comparison: None available. Technique: Axial CT images were obtained of the head without contrast administration.  Automated exposure control and iterative construction methods were used. Findings: No large territory infarct. There is no evidence of hemorrhage. No mass effect, edema or midline shift Unremarkable white matter No extra-axial fluid collection. The ventricles are normal in size and configuration. The visualized orbits are unremarkable. The visualized paranasal sinuses and mastoid air cells are clear. The visualized soft tissues are unremarkable. No acute osseous abnormality.     Impression: No acute intracranial abnormality. Electronically Signed: Jaiden Mcdonnell DO  5/31/2024 7:58 PM EDT  Workstation ID: [id number]    XR Chest 1 View    Result Date: 5/30/2024  XR CHEST 1 VW Date of Exam: 5/30/2024 9:41 PM EDT Indication: dyspnea, productive cough Comparison: None available. Findings: Lines: None Lungs: Poor respiratory effort accentuates the pulmonary vasculature and cardiomediastinal silhouette. No definite consolidation. Pleura: No pleural effusion or pneumothorax. Cardiomediastinum: The cardiomediastinal silhouette is normal Soft Tissues: Unremarkable. Bones: No acute osseous abnormality.     Impression: No acute abnormality. Electronically Signed: Jaiden Mcdonnell DO  5/30/2024 10:19 PM EDT  Workstation ID: [id number]    CT Abdomen Pelvis With Contrast    Result Date: 5/30/2024  CT ABDOMEN PELVIS W CONTRAST Date of Exam: 5/30/2024 8:04 PM EDT Indication: alcohol abuse abd/  left flank pain. Comparison: None available. Technique: Axial CT images were obtained of the abdomen and pelvis following the uneventful intravenous administration of 85 cc Isovue-300 IV contrast . Reconstructed coronal and sagittal images were also obtained. Automated exposure control and iterative construction methods were used. FINDINGS: Lung bases: No masses. No consolidation. Liver: Extensive decreased hepatic attenuation suggesting hepatic steatosis. Spleen:No masses. No perisplenic hematoma. Pancreas:No pancreatic masses. No evidence of pancreatitis. Gallbladder and common bile duct: Prior cholecystectomy. Adrenal glands:No adrenal masses Kidneys and ureters:No kidney stones. No renal masses.No calculi present within the ureters. Normal caliber ureters. Urinary bladder: Urinary bladder wall thickening and urothelial enhancement. Small bowel:Normal caliber small bowel. Large bowel:No diverticulosis or diverticulitis. No large bowel masses are appreciated Appendix: Not seen however there is no evidence of appendicitis. GENITOURINARY: Prior hysterectomy Ascites or pneumoperitoneum:None. Adenopathy:None present Osseous structures: Degenerative changes of the hips and lumbar spine. Other findings: None     Hepatic steatosis. Urothelial enhancement of the urinary bladder suggesting cystitis. Electronically Signed: Deric Martinez MD  5/30/2024 8:20 PM EDT  Workstation ID: [id number]                 Plan for Follow-up of Pending Labs/Results: Nothing pending    Discharge Details        Discharge Medications        New Medications        Instructions Start Date   folic acid 1 MG tablet  Commonly known as: FOLVITE   1 mg, Oral, Daily   Start Date: June 8, 2024     hydrOXYzine 25 MG tablet  Commonly known as: ATARAX   25 mg, Oral, 3 Times Daily PRN      melatonin 5 MG tablet tablet   5 mg, Oral, Nightly PRN      multivitamin with minerals tablet tablet   1 tablet, Oral, Daily   Start Date: June 8, 2024     ondansetron ODT 4  MG disintegrating tablet  Commonly known as: ZOFRAN-ODT   4 mg, Oral, Every 6 Hours PRN      propranolol 10 MG tablet  Commonly known as: INDERAL   10 mg, Oral, Every 12 Hours      sertraline 50 MG tablet  Commonly known as: ZOLOFT   50 mg, Oral, Daily   Start Date: June 8, 2024     thiamine 100 MG tablet  Commonly known as: VITAMIN B1   100 mg, Oral, Daily   Start Date: June 8, 2024              Allergies   Allergen Reactions   • Rifampin Nausea Only         Discharge Disposition:  Rehab Facility or Unit (DC - External)    Diet:  Hospital:  Diet Order   Procedures   • Diet: Gastrointestinal; Fiber-Restricted; Texture: Soft to Chew (NDD 3); Soft to Chew: Whole Meat; Fluid Consistency: Thin (IDDSI 0)       Diet Instructions       Diet: Regular/House Diet; Thin (IDDSI 0)      Discharge Diet: Regular/House Diet    Fluid Consistency: Thin (IDDSI 0)             Activity:  Activity Instructions       Activity as Tolerated              Restrictions or Other Recommendations:  No alcohol use, stop smoking       CODE STATUS:    Code Status and Medical Interventions:   Ordered at: 05/30/24 2223     Level Of Support Discussed With:    Patient     Code Status (Patient has no pulse and is not breathing):    CPR (Attempt to Resuscitate)     Medical Interventions (Patient has pulse or is breathing):    Full Support       No future appointments.    Additional Instructions for the Follow-ups that You Need to Schedule       Discharge Follow-up with PCP   As directed       Currently Documented PCP:    Provider, No Known    PCP Phone Number:    None     Follow Up Details: 1 week to discuss liver cirrhosis and new medications        Discharge Follow-up with Specified Provider: GI clinic to establish care for liver cirrhosis; 1 Month   As directed      To: GI clinic to establish care for liver cirrhosis   Follow Up: 1 Month                      Nicole Sepulveda MD  06/07/24      Time Spent on Discharge:  I spent  32  minutes on this  discharge activity which included: face-to-face encounter with the patient, reviewing the data in the system, coordination of the care with the nursing staff as well as consultants, documentation, and entering orders.

## 2024-06-07 NOTE — PLAN OF CARE
Goal Outcome Evaluation:     Problem: Adult Inpatient Plan of Care  Goal: Plan of Care Review  Outcome: Ongoing, Progressing  Goal: Patient-Specific Goal (Individualized)  Outcome: Ongoing, Progressing  Goal: Absence of Hospital-Acquired Illness or Injury  Outcome: Ongoing, Progressing  Intervention: Identify and Manage Fall Risk  Recent Flowsheet Documentation  Taken 6/7/2024 0200 by Margarito Anders RN  Safety Promotion/Fall Prevention:   activity supervised   assistive device/personal items within reach   clutter free environment maintained   nonskid shoes/slippers when out of bed   safety round/check completed  Taken 6/7/2024 0000 by Margarito Anders RN  Safety Promotion/Fall Prevention:   activity supervised   assistive device/personal items within reach   clutter free environment maintained   nonskid shoes/slippers when out of bed   safety round/check completed  Taken 6/6/2024 2200 by Margarito Anders RN  Safety Promotion/Fall Prevention:   activity supervised   assistive device/personal items within reach   clutter free environment maintained   nonskid shoes/slippers when out of bed   safety round/check completed  Taken 6/6/2024 2000 by Margarito Anders RN  Safety Promotion/Fall Prevention:   activity supervised   assistive device/personal items within reach   clutter free environment maintained   safety round/check completed  Intervention: Prevent Skin Injury  Recent Flowsheet Documentation  Taken 6/7/2024 0200 by Margarito Anders RN  Body Position: position changed independently  Taken 6/7/2024 0000 by Margarito Anders RN  Body Position: position changed independently  Taken 6/6/2024 2200 by Margarito Anders RN  Body Position: position changed independently  Taken 6/6/2024 2000 by Margarito Anders RN  Body Position: position changed independently  Intervention: Prevent and Manage VTE (Venous Thromboembolism) Risk  Recent Flowsheet Documentation  Taken 6/7/2024 0200 by Margarito Anders RN  Activity  Management: activity encouraged  Taken 6/7/2024 0000 by Margarito Anders RN  Activity Management: activity encouraged  Taken 6/6/2024 2200 by Margarito Anders RN  Activity Management: activity encouraged  Taken 6/6/2024 2000 by Margarito Anders RN  Activity Management: activity encouraged  Goal: Optimal Comfort and Wellbeing  Outcome: Ongoing, Progressing  Goal: Readiness for Transition of Care  Outcome: Ongoing, Progressing     Problem: COPD (Chronic Obstructive Pulmonary Disease) Comorbidity  Goal: Maintenance of COPD Symptom Control  Outcome: Ongoing, Progressing     Problem: Suicide Risk  Goal: Absence of Self-Harm  Outcome: Ongoing, Progressing     Problem: Skin Injury Risk Increased  Goal: Skin Health and Integrity  Outcome: Ongoing, Progressing  Intervention: Optimize Skin Protection  Recent Flowsheet Documentation  Taken 6/7/2024 0200 by Margarito Anders RN  Head of Bed (HOB) Positioning: HOB elevated  Taken 6/7/2024 0000 by Margarito Anders RN  Head of Bed (HOB) Positioning: HOB elevated  Taken 6/6/2024 2200 by Margarito Anders RN  Head of Bed (HOB) Positioning: HOB elevated  Taken 6/6/2024 2000 by Margarito Anders RN  Head of Bed (HOB) Positioning: HOB elevated     Problem: Fall Injury Risk  Goal: Absence of Fall and Fall-Related Injury  Outcome: Ongoing, Progressing  Intervention: Promote Injury-Free Environment  Recent Flowsheet Documentation  Taken 6/7/2024 0200 by Margarito Anders RN  Safety Promotion/Fall Prevention:   activity supervised   assistive device/personal items within reach   clutter free environment maintained   nonskid shoes/slippers when out of bed   safety round/check completed  Taken 6/7/2024 0000 by Margarito Anders RN  Safety Promotion/Fall Prevention:   activity supervised   assistive device/personal items within reach   clutter free environment maintained   nonskid shoes/slippers when out of bed   safety round/check completed  Taken 6/6/2024 2200 by Margarito Anders RN  Safety  Promotion/Fall Prevention:   activity supervised   assistive device/personal items within reach   clutter free environment maintained   nonskid shoes/slippers when out of bed   safety round/check completed  Taken 6/6/2024 2000 by Margarito Anders RN  Safety Promotion/Fall Prevention:   activity supervised   assistive device/personal items within reach   clutter free environment maintained   safety round/check completed

## 2024-06-07 NOTE — CASE MANAGEMENT/SOCIAL WORK
Continued Stay Note  [facility]     Patient Name: Gertrudis Perera  MRN: [id number]  Today's Date: 6/7/2024    Admit Date: 5/30/2024    Plan: Jason   Discharge Plan       Row Name 06/07/24 1441       Plan    Plan [facility]    Patient/Family in Agreement with Plan yes    Plan Comments Patient's plan at discharge is [facility]. Chemical dependency nurse is facilitating discharge. Family can transport. CM will continue to follow.    Final Discharge Disposition Code 01 - home or self-care                   Discharge Codes    No documentation.                 Expected Discharge Date and Time       Expected Discharge Date Expected Discharge Time    Jun 7, 2024               Maki Samano RN

## 2024-06-07 NOTE — CASE MANAGEMENT/SOCIAL WORK
Continued Stay Note  [facility]     Patient Name: Gertrudis Perera  MRN: [id number]  Today's Date: 6/7/2024    Admit Date: 5/30/2024    Plan: Jason - Sarah   Discharge Plan       Row Name 06/07/24 1514       [facility]    [facility]    Plan Comments [facility] that the patient has been accepted and can admit today. Patient is to arrive at [facility] by 5:00 p.m. Patient has been given the address, [address] [place] [address], and stated that her daughter, Corinne, will provide transportation.      Row Name 06/07/24 1441       Plan    Plan [facility]    Patient/Family in Agreement with Plan yes    Plan Comments [facility] Chemical dependency nurse is facilitating discharge. Family can transport. CM will continue to follow.    Final Discharge Disposition Code 01 - home or self-care                   Discharge Codes    No documentation.                 Expected Discharge Date and Time       Expected Discharge Date Expected Discharge Time    Jun 7, 2024               Jordana Coy   Addiction Treatment Navigator  Ext. [phone]

## 2024-06-07 NOTE — PLAN OF CARE
Goal Outcome Evaluation:  Plan of Care Reviewed With: patient        Progress: improving  Outcome Evaluation: Pt with good progress, but continues below baseline with self care/mobility d/t decreased activity tolerance with elevated HR with minimal activity.  Pt ind LBD, SBA to ambulate in hallway without AD.  Continue with OT to progress toward PLOF.

## 2024-06-07 NOTE — PLAN OF CARE
Goal Outcome Evaluation:  Plan of Care Reviewed With: patient        Progress: improving  Outcome Evaluation: Pt continues to present with decreased activity tolerance compared to baseline. Pt ambulated 325ft independently this session. Pt has progressed since last session. Recommend D/C home with planned AUD treatment at d/c.      Anticipated Discharge Disposition (PT): home